# Patient Record
Sex: FEMALE | Race: WHITE | URBAN - METROPOLITAN AREA
[De-identification: names, ages, dates, MRNs, and addresses within clinical notes are randomized per-mention and may not be internally consistent; named-entity substitution may affect disease eponyms.]

---

## 2017-06-22 ENCOUNTER — OFFICE VISIT (OUTPATIENT)
Dept: FAMILY MEDICINE CLINIC | Age: 67
End: 2017-06-22

## 2017-06-22 VITALS
WEIGHT: 136.8 LBS | BODY MASS INDEX: 24.24 KG/M2 | DIASTOLIC BLOOD PRESSURE: 85 MMHG | OXYGEN SATURATION: 97 % | HEIGHT: 63 IN | SYSTOLIC BLOOD PRESSURE: 156 MMHG | TEMPERATURE: 98.6 F | HEART RATE: 100 BPM | RESPIRATION RATE: 16 BRPM

## 2017-06-22 DIAGNOSIS — B02.9 HERPES ZOSTER WITHOUT COMPLICATION: Primary | ICD-10-CM

## 2017-06-22 RX ORDER — VALACYCLOVIR HYDROCHLORIDE 1 G/1
1000 TABLET, FILM COATED ORAL 3 TIMES DAILY
Qty: 21 TAB | Refills: 0 | Status: SHIPPED | OUTPATIENT
Start: 2017-06-22 | End: 2017-06-29

## 2017-06-22 NOTE — MR AVS SNAPSHOT
Visit Information Date & Time Provider Department Dept. Phone Encounter #  
 6/22/2017  3:30 PM Sandra Adam  [facility] [address] [phone] [id number] Follow-up Instructions Return in about 6 months (around 12/22/2017) for fasting cpe with Dr. Viviana Colin. Upcoming Health Maintenance Date Due  
 BREAST CANCER SCRN MAMMOGRAM 2/12/2017 GLAUCOMA SCREENING Q2Y 5/23/2017 OSTEOPOROSIS SCREENING (DEXA) 5/23/2017 Pneumococcal 65+ Low/Medium Risk (1 of 2 - PCV13) 5/23/2017 MEDICARE YEARLY EXAM 5/23/2017 INFLUENZA AGE 9 TO ADULT 8/1/2017 PAP AKA CERVICAL CYTOLOGY 7/17/2018 COLONOSCOPY 1/1/2021 DTaP/Tdap/Td series (2 - Td) 12/8/2026 Allergies as of 6/22/2017  Review Complete On: 6/22/2017 By: Sandra Adam NP Severity Noted Reaction Type Reactions Bactrim [Sulfamethoprim Ds]  10/03/2016    Hives Other Medication  09/28/2015    Nausea Only Lipitor 20mg dose- caused GI upset 5-2010 Vytorin 10-10 [Ezetimibe-simvastatin]  05/19/2010    Other (comments) Elevated LFT's  
  
Current Immunizations  Reviewed on 12/8/2016 Name Date Influenza Vaccine 10/22/2016, 10/6/2014 TD Vaccine 1/18/2010 Tdap 12/8/2016 Zoster Vaccine, Live 7/6/2014 Not reviewed this visit You Were Diagnosed With   
  
 Codes Comments Herpes zoster without complication    -  Primary ICD-10-CM: B02.9 ICD-9-CM: 507. 9 Vitals BP Pulse Temp Resp Height(growth percentile) Weight(growth percentile) 156/85 (BP 1 Location: Left arm, BP Patient Position: Sitting) 100 98.6 °F (37 °C) (Oral) 16 5' 2.5\" (1.588 m) 136 lb 12.8 oz (62.1 kg) SpO2 BMI OB Status Smoking Status 97% 24.62 kg/m2 Postmenopausal Never Smoker Vitals History BMI and BSA Data Body Mass Index Body Surface Area  
 24.62 kg/m 2 1.65 m 2 Preferred Pharmacy Pharmacy Name Phone [facility] [address] [place] [address], [address] [place] [address] [phone] Your Updated Medication List  
  
   
This list is accurate as of: 6/22/17  4:13 PM.  Always use your most recent med list.  
  
  
  
  
 aspirin 81 mg tablet Take  by mouth. atorvastatin 10 mg tablet Commonly known as:  LIPITOR  
TAKE 1 TABLET DAILY CALCIUM 600 + D PO Take  by mouth daily. FISH OIL 1,200-144-216 mg Cap Generic drug:  fish oil-dha-epa Take  by mouth daily. levothyroxine 50 mcg tablet Commonly known as:  SYNTHROID  
TAKE 1 TABLET DAILY BEFORE BREAKFAST  
  
 mometasone 50 mcg/actuation nasal spray Commonly known as:  NASONEX  
2 Sprays by Both Nostrils route daily. tolterodine ER 4 mg ER capsule Commonly known as:  DETROL LA  
TAKE 1 CAPSULE DAILY  
  
 valACYclovir 1 gram tablet Commonly known as:  VALTREX Take 1 Tab by mouth three (3) times daily for 7 days. Prescriptions Sent to Pharmacy Refills  
 valACYclovir (VALTREX) 1 gram tablet 0 Sig: Take 1 Tab by mouth three (3) times daily for 7 days. Class: Normal  
 Pharmacy: [facility] [address] [place] [address], [address] [place] [address] #: [phone] Route: Oral  
  
Follow-up Instructions Return in about 6 months (around 12/22/2017) for fasting cpe with Dr. Wesley Phoenix. Patient Instructions Shingles: Care Instructions Your Care Instructions Shingles (herpes zoster) causes pain and a blistered rash. The rash can appear anywhere on the body but will be on only one side of the body, the left or right. It will be in a band, a strip, or a small area. The pain can be very severe. Shingles can also cause tingling or itching in the area of the rash. The blisters scab over after a few days and heal in 2 to 4 weeks.  Medicines can help you feel better and may help prevent more serious problems caused by shingles. Shingles is caused by the same virus that causes chickenpox. When you have chickenpox, the virus gets into your nerve roots and stays there (becomes dormant) long after you get over the chickenpox. If the virus becomes active again, it can cause shingles. Follow-up care is a key part of your treatment and safety. Be sure to make and go to all appointments, and call your doctor if you are having problems. It's also a good idea to know your test results and keep a list of the medicines you take. How can you care for yourself at home? · Be safe with medicines. Take your medicines exactly as prescribed. Call your doctor if you think you are having a problem with your medicine. Antiviral medicine helps you get better faster. · Try not to scratch or pick at the blisters. They will crust over and fall off on their own if you leave them alone. · Put cool, wet cloths on the area to relieve pain and itching. You can also use calamine lotion. Try not to use so much lotion that it cakes and is hard to get off. · Put cornstarch or baking soda on the sores to help dry them out so they heal faster. · Do not use thick ointment, such as petroleum jelly, on the sores. This will keep them from drying and healing. · To help remove loose crusts, soak them in tap water. This can help decrease oozing, and dry and soothe the skin. · Take an over-the-counter pain medicine, such as acetaminophen (Tylenol), ibuprofen (Advil, Motrin), or naproxen (Aleve). Read and follow all instructions on the label. · Avoid close contact with people until the blisters have healed. It is very important for you to avoid contact with anyone who has never had chickenpox or the chickenpox vaccine. Pregnant women, young babies, and anyone else who has a hard time fighting infection (such as someone with HIV, diabetes, or cancer) is especially at risk. When should you call for help? Call your doctor now or seek immediate medical care if: 
· You have a new or higher fever. · You have a severe headache and a stiff neck. · You lose the ability to think clearly. · The rash spreads to your forehead, nose, eyes, or eyelids. · You have eye pain, or your vision gets worse. · You have new pain in your face, or you cannot move the muscles in your face. · Blisters spread to new parts of your body. Watch closely for changes in your health, and be sure to contact your doctor if: · The rash has not healed after 2 to 4 weeks. · You still have pain after the rash has healed. Where can you learn more? Go to http://cynthia-iris.info/. Yeimi Mariscal in the search box to learn more about \"Shingles: Care Instructions. \" Current as of: March 3, 2017 Content Version: 11.3 © 6592-7468 [employer]. Care instructions adapted under license by [employer] (which disclaims liability or warranty for this information). If you have questions about a medical condition or this instruction, always ask your healthcare professional. [place] [address] any warranty or liability for your use of this information. Introducing [facility]! Dear [address]: Thank you for requesting a Medbox account. Our records indicate that you already have an active Medbox account. You can access your account anytime at [email or web address] Did you know that you can access your hospital and ER discharge instructions at any time in Medbox? You can also review all of your test results from your hospital stay or ER visit. Additional Information If you have questions, please visit the Frequently Asked Questions section of the Medbox website at [email or web address] Remember, Medbox is NOT to be used for urgent needs. For medical emergencies, dial 911. Now available from your iPhone and Android! Please provide this summary of care documentation to your next provider. Your primary care clinician is listed as LUPIS SEARS. If you have any questions after today's visit, please call [phone].

## 2017-06-22 NOTE — PROGRESS NOTES
Subjective:      Sharon Guaman is a 74 y.o. female who presents for painful rash. Symptoms presented rather suddenly. No known similar contacts. Denies associated fever, chills, or body aches. Has not attempted otc treatment. Currently living with young grandchild. Current Outpatient Prescriptions   Medication Sig Dispense Refill    valACYclovir (VALTREX) 1 gram tablet Take 1 Tab by mouth three (3) times daily for 7 days. 21 Tab 0    levothyroxine (SYNTHROID) 50 mcg tablet TAKE 1 TABLET DAILY BEFORE BREAKFAST 90 Tab 3    tolterodine ER (DETROL LA) 4 mg ER capsule TAKE 1 CAPSULE DAILY 90 Cap 3    atorvastatin (LIPITOR) 10 mg tablet TAKE 1 TABLET DAILY 90 Tab 3    mometasone (NASONEX) 50 mcg/actuation nasal spray 2 Sprays by Both Nostrils route daily. (Patient taking differently: 2 Sprays by Both Nostrils route daily as needed.) 3 Container 1    aspirin 81 mg tablet Take  by mouth.  CALCIUM CARBONATE/VITAMIN D3 (CALCIUM 600 + D PO) Take  by mouth daily.  fish oil-dha-epa (FISH OIL) 1,200-144-216 mg Cap Take  by mouth daily. Allergies   Allergen Reactions    Bactrim [Sulfamethoprim Ds] Hives    Other Medication Nausea Only     Lipitor 20mg dose- caused GI upset 5-2010    Vytorin 10-10 [Ezetimibe-Simvastatin] Other (comments)     Elevated LFT's       ROS:   Complete review of systems was reviewed with pertinent information listed in HPI. Objective:     Visit Vitals    /85 (BP 1 Location: Left arm, BP Patient Position: Sitting)    Pulse 100    Temp 98.6 °F (37 °C) (Oral)    Resp 16    Ht 5' 2.5\" (1.588 m)    Wt 136 lb 12.8 oz (62.1 kg)    SpO2 97%    BMI 24.62 kg/m2       Vitals and Nurse Documentation reviewed. General:  alert, cooperative, no distress   Skin: Herpetiform rash with surrounding erythema to the collarbone following a linear quality. Assessment/Plan:     Flavio Schilder was seen today for rash.     Diagnoses and all orders for this visit:    Herpes zoster without complication  -     valACYclovir (VALTREX) 1 gram tablet; Take 1 Tab by mouth three (3) times daily for 7 days. Treatment as above. Follow up if no improvement. Discussed potential for transmission of chickenpox.        Discussed expected course/resolution/complications of diagnosis in detail with patient.    Medication risks/benefits/costs/interactions/alternatives discussed with patient.    Pt was given an after visit summary which includes diagnoses, current medications & vitals.    Pt expressed understanding with the diagnosis and plan    Follow-up Disposition:  Return in about 6 months (around 12/22/2017) for fasting cpe with Dr. Tae Elena.

## 2017-06-22 NOTE — PATIENT INSTRUCTIONS

## 2017-06-22 NOTE — PROGRESS NOTES
Chief Complaint   Patient presents with    Rash     Rash on left side of neck noticed 6/20/2017     1. Have you been to the ER, urgent care clinic since your last visit? Hospitalized since your last visit? No    2. Have you seen or consulted any other health care providers outside of the [facility] since your last visit? Include any pap smears or colon screening.  No

## 2017-12-12 NOTE — TELEPHONE ENCOUNTER
Patient last seen by me 12-8-16 and due annual check but nothing scheduled and I am getting 90 day supply refill request. Can we get her in for a fasting routine med check for now and labs? Would she have enough to last til then?

## 2017-12-12 NOTE — TELEPHONE ENCOUNTER
Left message on Identified VM to call office a [phone] to schedule appointment, as she has not been seen in a year

## 2017-12-21 RX ORDER — ATORVASTATIN CALCIUM 10 MG/1
TABLET, FILM COATED ORAL
Qty: 30 TAB | Refills: 0 | Status: SHIPPED | OUTPATIENT
Start: 2017-12-21 | End: 2018-01-15 | Stop reason: SDUPTHER

## 2017-12-21 RX ORDER — TOLTERODINE 4 MG/1
CAPSULE, EXTENDED RELEASE ORAL
Qty: 30 CAP | Refills: 0 | Status: SHIPPED | OUTPATIENT
Start: 2017-12-21 | End: 2018-01-15 | Stop reason: SDUPTHER

## 2017-12-21 RX ORDER — LEVOTHYROXINE SODIUM 50 UG/1
TABLET ORAL
Qty: 30 TAB | Refills: 0 | Status: SHIPPED | OUTPATIENT
Start: 2017-12-21 | End: 2018-01-15 | Stop reason: SDUPTHER

## 2017-12-21 NOTE — TELEPHONE ENCOUNTER
----- Message from Pradip Franks sent at 12/21/2017  2:38 PM EST -----  Regarding: Dr. Joe Valle / Edi Arreguin  Pt is requesting a refill on Rx \"Atorvastatin\" 10 mg. Pt has 3 pills left that will last 3 days. [facility]: [phone]. Pt unsure of fax.    Pt's best contact: [phone]

## 2017-12-21 NOTE — TELEPHONE ENCOUNTER
Spoke to pt and she was scheduled for 12/22/17 for a physical.  She just found out today that she didn't have one anymore. I scheduled her for 1/15 @ 10:30. She said doing a 30 day supply to [facility] would be fine. rx sent in for 30 day per verbal order from Dr Bettie Banegas.

## 2018-01-15 ENCOUNTER — OFFICE VISIT (OUTPATIENT)
Dept: FAMILY MEDICINE CLINIC | Age: 68
End: 2018-01-15

## 2018-01-15 VITALS
SYSTOLIC BLOOD PRESSURE: 120 MMHG | HEIGHT: 63 IN | WEIGHT: 138.2 LBS | HEART RATE: 92 BPM | OXYGEN SATURATION: 99 % | DIASTOLIC BLOOD PRESSURE: 70 MMHG | RESPIRATION RATE: 16 BRPM | BODY MASS INDEX: 24.49 KG/M2 | TEMPERATURE: 98.8 F

## 2018-01-15 DIAGNOSIS — N32.81 OVERACTIVE BLADDER: ICD-10-CM

## 2018-01-15 DIAGNOSIS — Z11.59 NEED FOR HEPATITIS C SCREENING TEST: ICD-10-CM

## 2018-01-15 DIAGNOSIS — Z23 ENCOUNTER FOR IMMUNIZATION: ICD-10-CM

## 2018-01-15 DIAGNOSIS — Z00.00 ROUTINE GENERAL MEDICAL EXAMINATION AT A HEALTH CARE FACILITY: Primary | ICD-10-CM

## 2018-01-15 DIAGNOSIS — E03.9 HYPOTHYROIDISM, UNSPECIFIED TYPE: ICD-10-CM

## 2018-01-15 DIAGNOSIS — E78.00 HYPERCHOLESTEROLEMIA: ICD-10-CM

## 2018-01-15 RX ORDER — LEVOTHYROXINE SODIUM 50 UG/1
TABLET ORAL
Qty: 30 TAB | Refills: 0 | Status: SHIPPED | OUTPATIENT
Start: 2018-01-15 | End: 2018-02-03 | Stop reason: SDUPTHER

## 2018-01-15 RX ORDER — BISMUTH SUBSALICYLATE 262 MG
1 TABLET,CHEWABLE ORAL DAILY
COMMUNITY

## 2018-01-15 RX ORDER — ATORVASTATIN CALCIUM 10 MG/1
TABLET, FILM COATED ORAL
Qty: 30 TAB | Refills: 0 | Status: SHIPPED | OUTPATIENT
Start: 2018-01-15 | End: 2018-02-03 | Stop reason: SDUPTHER

## 2018-01-15 RX ORDER — TOLTERODINE 4 MG/1
CAPSULE, EXTENDED RELEASE ORAL
Qty: 30 CAP | Refills: 0 | Status: SHIPPED | OUTPATIENT
Start: 2018-01-15 | End: 2018-02-03 | Stop reason: SDUPTHER

## 2018-01-15 NOTE — PATIENT INSTRUCTIONS

## 2018-01-15 NOTE — PROGRESS NOTES
Chief Complaint   Patient presents with    Complete Physical     fasting - has a gyn -      1. Have you been to the ER, urgent care clinic since your last visit? Hospitalized since your last visit? No    2. Have you seen or consulted any other health care providers outside of the [address] [place] [address] since your last visit? Include any pap smears or colon screening.    Yes Dr César Mcneil - will fax for mammogram results

## 2018-01-15 NOTE — PROGRESS NOTES
HISTORY OF PRESENT ILLNESS   HPI  Annual CPE, fasting and follow up hypercholesterolemia, hypothyroidism, OAB, labs and medication check w/ rx renewals. Overall patient has been getting along well and feeling well in general.  Doing well on current medication regimen, tolerating w/o reaction or side effects. REVIEW OF SYMPTOMS     Review of Systems   Constitutional: Negative. HENT: Negative. Eyes:        Up to date routine eye exam, reportedly things are stable/ok   Respiratory: Negative. Cardiovascular: Negative. Gastrointestinal: Negative. Genitourinary: Negative. Detrol working well to control OAB sx   Musculoskeletal: Negative. Negative for myalgias. Neurological: Negative.     Endo/Heme/Allergies: Positive for environmental allergies (uses Nasonex seaasonally prn).           PROBLEM LIST/MEDICAL HISTORY      Problem List  Date Reviewed: 1/15/2018          Codes Class Noted    Hypercholesterolemia ICD-10-CM: E78.00  ICD-9-CM: 272.0  5/13/2011        H/O Transient Elevated liver enzymes ~2007, w/u Negative ICD-10-CM: R74.8  ICD-9-CM: 790.5  5/13/2011    Overview Signed 5/13/2011  1:06 PM by Rozina Rowan MD     2007, Hepatitis panel, GI w/u  and U/s negative             Overactive bladder ICD-10-CM: N32.81  ICD-9-CM: 596.51  5/13/2011        Hypothyroidism ICD-10-CM: E03.9  ICD-9-CM: 244.9  5/19/2010    Overview Signed 5/19/2010  3:46 PM by Yin Jean     2000             Family history of MI (myocardial infarction) ICD-10-CM: Z82.49  ICD-9-CM: V17.3  5/19/2010    Overview Signed 5/19/2010  3:46 PM by Yin Jean     Paternal at age 53 yo             Osteopenia ICD-10-CM: M85.80  ICD-9-CM: 733.90  5/19/2010    Overview Addendum 1/15/2018 10:52 AM by Rozina Rowan MD     Dexa per Gyn Dr Reyna Miller 12/2009, q 3 yrs             AR (allergic rhinitis) ICD-10-CM: J30.9  ICD-9-CM: 477.9  7/31/2002    Overview Addendum 8/28/2014  8:36 AM by Rozina Rowan MD ~2002; Fall worse season                       PAST SURGICAL HISTORY       Past Surgical History:   Procedure Laterality Date    COLONOSCOPY  2/2003    normal, but f/u 5 yr; Dr Osmany Burciaga  2011    ok x 10 yrs    EKG ORDERABLE  1/18/2010    Normal, NSR, rate 76    HM DEXA SCAN  12/2009    Osteopenia, per Gyn    [address]    for infertility eval    HX SALPINGO-OOPHORECTOMY  1970's    for ectopic pregnancy    COOPER MAMMOGRAM SCREEN BILAT  VPI q yr    annually per Dr Jones Haro ABD COMP  9/2007, [facility]    normal         MEDICATIONS      Current Outpatient Prescriptions   Medication Sig    multivitamin (ONE A DAY) tablet Take 1 Tab by mouth daily.  tolterodine ER (DETROL LA) 4 mg ER capsule TAKE 1 CAPSULE DAILY  Indications: Bladder Hyperactivity    levothyroxine (SYNTHROID) 50 mcg tablet TAKE 1 TABLET DAILY BEFORE BREAKFAST  Indications: hypothyroidism    atorvastatin (LIPITOR) 10 mg tablet TAKE 1 TABLET DAILY  Indications: hypercholesterolemia    mometasone (NASONEX) 50 mcg/actuation nasal spray 2 Sprays by Both Nostrils route daily. (Patient taking differently: 2 Sprays by Both Nostrils route daily as needed.)    aspirin 81 mg tablet Take  by mouth.  CALCIUM CARBONATE/VITAMIN D3 (CALCIUM 600 + D PO) Take  by mouth daily.  fish oil-dha-epa (FISH OIL) 1,200-144-216 mg Cap Take  by mouth daily. No current facility-administered medications for this visit.            ALLERGIES     Allergies   Allergen Reactions    Bactrim [Sulfamethoprim Ds] Hives    Other Medication Nausea Only     Lipitor 20mg dose- caused GI upset 5-2010    Vytorin 10-10 [Ezetimibe-Simvastatin] Other (comments)     Elevated LFT's          SOCIAL HISTORY       Social History     Social History    Marital status:      Spouse name: N/A    Number of children: 2    Years of education: N/A     Occupational History    HR      Social History Main Topics    Smoking status: Never Smoker    Smokeless tobacco: Never Used    Alcohol use Yes      Comment: occasional wine    Drug use: No    Sexual activity: Yes     Partners: Male     Other Topics Concern    Caffeine Concern No     2 cups of coffee qd    Weight Concern No     pretty stable, runs btw 130-135 over the yrs    Special Diet No     healthy diet    Exercise Yes     walks qd x 30-45 minutes     Social History Narrative    2 adopted children; her daughter, son in law and new grandbaby live w her now        IMMUNIZATIONS  Immunization History   Administered Date(s) Administered    Influenza Vaccine 10/06/2014, 10/22/2016, 10/17/2017    TD Vaccine 2010    Tdap 2016    Zoster Vaccine, Live 2014         FAMILY HISTORY     Family History   Problem Relation Age of Onset    Breast Cancer Mother      dx in her mid 66's, survivor    Hypertension Mother     Stroke Mother     Thyroid Disease Mother     Heart Disease Mother       of pneumonia age 80    Heart Disease Father     Heart Attack Father 52    Breast Cancer Sister 48     survivor    High Cholesterol Sister     High Cholesterol Brother     High Cholesterol Brother          VITALS     Visit Vitals    /70 (BP 1 Location: Left arm, BP Patient Position: Sitting)    Pulse 92    Temp 98.8 °F (37.1 °C) (Oral)    Resp 16    Ht 5' 2.75\" (1.594 m)    Wt 138 lb 3.2 oz (62.7 kg)    SpO2 99%    BMI 24.68 kg/m2          PHYSICAL EXAMINATION     Physical Exam   Constitutional: She is oriented to person, place, and time and well-developed, well-nourished, and in no distress. HENT:   Right Ear: Tympanic membrane normal.   Left Ear: Tympanic membrane normal.   Nose: Nose normal.   Mouth/Throat: Oropharynx is clear and moist. No oropharyngeal exudate. Eyes: Conjunctivae and EOM are normal. Pupils are equal, round, and reactive to light. Neck: Neck supple. No JVD present. Carotid bruit is not present. No thyromegaly present.    Cardiovascular: Normal rate, regular rhythm and normal heart sounds. No murmur heard. Pulmonary/Chest: Effort normal and breath sounds normal.   Abdominal: Soft. Bowel sounds are normal. She exhibits no distension and no mass. There is no tenderness. Musculoskeletal: Normal range of motion. She exhibits no edema or tenderness. Lymphadenopathy:     She has no cervical adenopathy. Neurological: She is alert and oriented to person, place, and time. She has normal reflexes. No cranial nerve deficit. Coordination normal.   Skin: Skin is warm. Psychiatric: Affect normal.   Vitals reviewed.              ASSESSMENT & PLAN       ICD-10-CM ICD-9-CM    1. Routine general medical examination at a health care facility Z00.00 V70.0 CBC W/O DIFF      LIPID PANEL      METABOLIC PANEL, COMPREHENSIVE      TSH 3RD GENERATION      T4, FREE      URINALYSIS W/ RFLX MICROSCOPIC   2. Hypercholesterolemia E78.00 272.0 LIPID PANEL      atorvastatin (LIPITOR) 10 mg tablet   3. Hypothyroidism, unspecified type E03.9 244.9 TSH 3RD GENERATION      T4, FREE      levothyroxine (SYNTHROID) 50 mcg tablet   4. Overactive bladder N32.81 596.51 URINALYSIS W/ RFLX MICROSCOPIC      tolterodine ER (DETROL LA) 4 mg ER capsule   5. Need for hepatitis C screening test Z11.59 V73.89 HEPATITIS C AB   6. Encounter for immunization Z23 V03.89 PNEUMOCOCCAL CONJ VACCINE 13 VALENT IM      [place] IMMUNIZ ADMIN,1 SINGLE/COMB VAC/TOXOID     Fasting labs today  Reviewed diet, exercise and weight control  Cardiovascular risk and specific lipid/LDL goals reviewed  Reviewed medications and side effects, doing well on current medication regimen. She would like 30 day local supply sent to her local pharm, then after review of today's labs will need rx renewal for 90 days through [facility] for the year. Further follow up & other recommendations pending review of labs. If all remains good and stable, rtc 1 yr annual checkup.  Follow up sooner prn  Sees gyn annually in ~ August. Had reportedly negative mammogram and last pap reportedly negative and told can dc.  Colonoscopy up to date.

## 2018-01-16 LAB
ALBUMIN SERPL-MCNC: 4.5 G/DL (ref 3.6–4.8)
ALBUMIN/GLOB SERPL: 1.8 {RATIO} (ref 1.2–2.2)
ALP SERPL-CCNC: 67 IU/L (ref 39–117)
ALT SERPL-CCNC: 16 IU/L (ref 0–32)
APPEARANCE UR: CLEAR
AST SERPL-CCNC: 20 IU/L (ref 0–40)
BILIRUB SERPL-MCNC: 0.3 MG/DL (ref 0–1.2)
BILIRUB UR QL STRIP: NEGATIVE
BUN SERPL-MCNC: 14 MG/DL (ref 8–27)
BUN/CREAT SERPL: 18 (ref 12–28)
CALCIUM SERPL-MCNC: 9.4 MG/DL (ref 8.7–10.3)
CHLORIDE SERPL-SCNC: 99 MMOL/L (ref 96–106)
CHOLEST SERPL-MCNC: 177 MG/DL (ref 100–199)
CO2 SERPL-SCNC: 27 MMOL/L (ref 18–29)
COLOR UR: YELLOW
CREAT SERPL-MCNC: 0.76 MG/DL (ref 0.57–1)
ERYTHROCYTE [DISTWIDTH] IN BLOOD BY AUTOMATED COUNT: 12.9 % (ref 12.3–15.4)
GLOBULIN SER CALC-MCNC: 2.5 G/DL (ref 1.5–4.5)
GLUCOSE SERPL-MCNC: 88 MG/DL (ref 65–99)
GLUCOSE UR QL: NEGATIVE
HCT VFR BLD AUTO: 36.4 % (ref 34–46.6)
HCV AB S/CO SERPL IA: <0.1 S/CO RATIO (ref 0–0.9)
HDLC SERPL-MCNC: 52 MG/DL
HGB BLD-MCNC: 11.9 G/DL (ref 11.1–15.9)
HGB UR QL STRIP: NEGATIVE
INTERPRETATION, 910389: NORMAL
KETONES UR QL STRIP: NEGATIVE
LDLC SERPL CALC-MCNC: 98 MG/DL (ref 0–99)
LEUKOCYTE ESTERASE UR QL STRIP: NEGATIVE
MCH RBC QN AUTO: 30.1 PG (ref 26.6–33)
MCHC RBC AUTO-ENTMCNC: 32.7 G/DL (ref 31.5–35.7)
MCV RBC AUTO: 92 FL (ref 79–97)
MICRO URNS: NORMAL
NITRITE UR QL STRIP: NEGATIVE
PH UR STRIP: 6 [PH] (ref 5–7.5)
PLATELET # BLD AUTO: 367 X10E3/UL (ref 150–379)
POTASSIUM SERPL-SCNC: 4.2 MMOL/L (ref 3.5–5.2)
PROT SERPL-MCNC: 7 G/DL (ref 6–8.5)
PROT UR QL STRIP: NEGATIVE
RBC # BLD AUTO: 3.95 X10E6/UL (ref 3.77–5.28)
SODIUM SERPL-SCNC: 140 MMOL/L (ref 134–144)
SP GR UR: 1.02 (ref 1–1.03)
T4 FREE SERPL-MCNC: 1.32 NG/DL (ref 0.82–1.77)
TRIGL SERPL-MCNC: 134 MG/DL (ref 0–149)
TSH SERPL DL<=0.005 MIU/L-ACNC: 2.87 UIU/ML (ref 0.45–4.5)
UROBILINOGEN UR STRIP-MCNC: 0.2 MG/DL (ref 0.2–1)
VLDLC SERPL CALC-MCNC: 27 MG/DL (ref 5–40)
WBC # BLD AUTO: 7 X10E3/UL (ref 3.4–10.8)

## 2018-01-28 ENCOUNTER — TELEPHONE (OUTPATIENT)
Dept: FAMILY MEDICINE CLINIC | Age: 68
End: 2018-01-28

## 2018-01-28 NOTE — TELEPHONE ENCOUNTER
Hep C negative  Urine, Blood counts, liver, kidney, thyroid  BS normal  Lipids excellent  Things look good  Keep up good work  Same meds  Rtc 1 yr

## 2018-02-03 DIAGNOSIS — N32.81 OVERACTIVE BLADDER: ICD-10-CM

## 2018-02-03 DIAGNOSIS — E03.9 HYPOTHYROIDISM, UNSPECIFIED TYPE: ICD-10-CM

## 2018-02-03 DIAGNOSIS — E78.00 HYPERCHOLESTEROLEMIA: Primary | ICD-10-CM

## 2018-02-03 RX ORDER — TOLTERODINE 4 MG/1
CAPSULE, EXTENDED RELEASE ORAL
Qty: 90 CAP | Refills: 3 | Status: SHIPPED | OUTPATIENT
Start: 2018-02-03 | End: 2019-02-04 | Stop reason: SDUPTHER

## 2018-02-03 RX ORDER — ATORVASTATIN CALCIUM 10 MG/1
TABLET, FILM COATED ORAL
Qty: 90 TAB | Refills: 3 | Status: SHIPPED | OUTPATIENT
Start: 2018-02-03 | End: 2019-02-04 | Stop reason: SDUPTHER

## 2018-02-03 RX ORDER — LEVOTHYROXINE SODIUM 50 UG/1
TABLET ORAL
Qty: 90 TAB | Refills: 3 | Status: SHIPPED | OUTPATIENT
Start: 2018-02-03 | End: 2019-02-04 | Stop reason: SDUPTHER

## 2019-01-16 ENCOUNTER — HOSPITAL ENCOUNTER (OUTPATIENT)
Dept: LAB | Age: 69
Discharge: HOME | End: 2019-01-16
Payer: MEDICARE

## 2019-01-16 ENCOUNTER — OFFICE VISIT (OUTPATIENT)
Dept: FAMILY MEDICINE CLINIC | Age: 69
End: 2019-01-16

## 2019-01-16 VITALS
HEIGHT: 63 IN | DIASTOLIC BLOOD PRESSURE: 76 MMHG | RESPIRATION RATE: 18 BRPM | WEIGHT: 135 LBS | OXYGEN SATURATION: 97 % | HEART RATE: 103 BPM | TEMPERATURE: 99.6 F | BODY MASS INDEX: 23.92 KG/M2 | SYSTOLIC BLOOD PRESSURE: 114 MMHG

## 2019-01-16 DIAGNOSIS — E78.00 HYPERCHOLESTEROLEMIA: ICD-10-CM

## 2019-01-16 DIAGNOSIS — E03.9 HYPOTHYROIDISM, UNSPECIFIED TYPE: ICD-10-CM

## 2019-01-16 DIAGNOSIS — Z23 ENCOUNTER FOR IMMUNIZATION: ICD-10-CM

## 2019-01-16 DIAGNOSIS — Z00.00 WELCOME TO MEDICARE PREVENTIVE VISIT: Primary | ICD-10-CM

## 2019-01-16 DIAGNOSIS — Z71.89 ACP (ADVANCE CARE PLANNING): ICD-10-CM

## 2019-01-16 PROCEDURE — 80053 COMPREHEN METABOLIC PANEL: CPT

## 2019-01-16 PROCEDURE — 80061 LIPID PANEL: CPT

## 2019-01-16 PROCEDURE — 36415 COLL VENOUS BLD VENIPUNCTURE: CPT

## 2019-01-16 PROCEDURE — 85027 COMPLETE CBC AUTOMATED: CPT

## 2019-01-16 PROCEDURE — 84443 ASSAY THYROID STIM HORMONE: CPT

## 2019-01-16 NOTE — PROGRESS NOTES
This is a \"Welcome to [employer]"  Initial Preventive Physical Examination (IPPE) providing Personalized Prevention Plan Services (Performed in the first 12 months of enrollment) I have reviewed the patient's medical history in detail and updated the computerized patient record. History Past Medical History:  
Diagnosis Date  AR (allergic rhinitis) 7/31/2002  Family history of MI (myocardial infarction) 5/19/2010  
 H/O Transient Elevated liver enzymes ~2007, w/u Negative 5/13/2011  Hypercholesterolemia 5/13/2011  Hypothyroidism 5/19/2010  Osteopenia 5/19/2010  Overactive bladder 5/13/2011 Past Surgical History:  
Procedure Laterality Date  COLONOSCOPY  2/2003  
 normal, but f/u 5 yr; Dr Fernández Massed  COLONOSCOPY  2011  
 ok x 10 yrs, Dr Chopra Loop  EKG ORDERABLE  1/18/2010 Normal, NSR, rate 76  
 HM DEXA SCAN  12/2009 Osteopenia, per Gyn  
 [address] [place] [address] Bl  
 for infertility eval  
 HX SALPINGO-OOPHORECTOMY  1970's  
 for ectopic pregnancy  COOPER MAMMOGRAM SCREEN BILAT  VPI q yr  
 annually per Dr Jimmy Hammond  US ABD COMP  9/2007, [address]  
 normal  
 
Current Outpatient Medications Medication Sig Dispense Refill  tolterodine ER (DETROL LA) 4 mg ER capsule TAKE 1 CAPSULE DAILY  Indications: Bladder Hyperactivity 90 Cap 3  
 levothyroxine (SYNTHROID) 50 mcg tablet TAKE 1 TABLET DAILY BEFORE BREAKFAST  Indications: hypothyroidism 90 Tab 3  
 atorvastatin (LIPITOR) 10 mg tablet TAKE 1 TABLET DAILY  Indications: hypercholesterolemia 90 Tab 3  
 multivitamin (ONE A DAY) tablet Take 1 Tab by mouth daily.  mometasone (NASONEX) 50 mcg/actuation nasal spray 2 Sprays by Both Nostrils route daily. (Patient taking differently: 2 Sprays by Both Nostrils route daily as needed.) 3 Container 1  
 aspirin 81 mg tablet Take  by mouth.  CALCIUM CARBONATE/VITAMIN D3 (CALCIUM 600 + D PO) Take  by mouth daily.  fish oil-dha-epa (FISH OIL) 1,200-144-216 mg Cap Take  by mouth daily. Allergies Allergen Reactions  Bactrim [Sulfamethoprim Ds] Hives  Other Medication Nausea Only Lipitor 20mg dose- caused GI upset -  Vytorin 10-10 [Ezetimibe-Simvastatin] Other (comments) Elevated LFT's Family History Problem Relation Age of Onset  Breast Cancer Mother   
     dx in her mid 66's, survivor  Hypertension Mother  Stroke Mother  Thyroid Disease Mother  Heart Disease Mother   
      of pneumonia age 80  
 Heart Disease Father  Heart Attack Father 52  Breast Cancer Sister 48  
     survivor  High Cholesterol Sister  High Cholesterol Brother  High Cholesterol Brother Social History Tobacco Use  Smoking status: Never Smoker  Smokeless tobacco: Never Used Substance Use Topics  Alcohol use: Yes Comment: occasional wine Diet, Lifestyle: No special diet Exercise level: moderately active Depression Risk Screen PHQ over the last two weeks 2019 PHQ Not Done - Little interest or pleasure in doing things Not at all Feeling down, depressed, irritable, or hopeless Not at all Total Score PHQ 2 0 Alcohol Risk Screen You do not drink alcohol or very rarely. Functional Ability and Level of Safety Hearing Loss Hearing is good. Vision Screening Vision is good. No exam data present Activities of Daily Living The home contains: no safety equipment. Patient does total self care Fall Risk Screen Fall Risk Assessment, last 12 mths 2019 Able to walk? Yes Fall in past 12 months? No  
 
 
Abuse Screen Patient is not abused Screening EKG EKG order placed: No 
 
Patient Care Team  
Patient Care Team: 
Darrion Morris MD as PCP - General 
Dorothy Mojica MD (Obstetrics & Gynecology) Ritika Deutsch MD (Obstetrics & Gynecology) End of Life Planning Advanced care planning directives were discussed with the patient and /or family/caregiver. Assessment/Plan Education and counseling provided: 
Are appropriate based on today's review and evaluation Diagnoses and all orders for this visit: 
 
1. Welcome to Medicare preventive visit 2. Encounter for immunization -     ADMIN PNEUMOCOCCAL VACCINE 
-     PNEUMOCOCCAL POLYSACCHARIDE VACCINE, 23-VALENT, ADULT OR IMMUNOSUPPRESSED PT DOSE, 3. ACP Discussion and note updated Health Maintenance Due Topic Date Due  Pneumococcal 65+ Low/Medium Risk (2 of 2 - PPSV23) 01/15/2019

## 2019-01-16 NOTE — PATIENT INSTRUCTIONS
Medicare Wellness Visit, Female The best way to live healthy is to have a lifestyle where you eat a well-balanced diet, exercise regularly, limit alcohol use, and quit all forms of tobacco/nicotine, if applicable. Regular preventive services are another way to keep healthy. Preventive services (vaccines, screening tests, monitoring & exams) can help personalize your care plan, which helps you manage your own care. Screening tests can find health problems at the earliest stages, when they are easiest to treat. [facility] follows the current, evidence-based guidelines published by the [facility] Frankie Ara ([facility]) when recommending preventive services for our patients. Because we follow these guidelines, sometimes recommendations change over time as research supports it. (For example, mammograms used to be recommended annually. Even though Medicare will still pay for an annual mammogram, the newer guidelines recommend a mammogram every two years for women of average risk.) Of course, you and your doctor may decide to screen more often for some diseases, based on your risk and your health status. Preventive services for you include: - Medicare offers their members a free annual wellness visit, which is time for you and your primary care provider to discuss and plan for your preventive service needs. Take advantage of this benefit every year! 
-All adults over the age of 72 should receive the recommended pneumonia vaccines. Current USPSTF guidelines recommend a series of two vaccines for the best pneumonia protection.  
-All adults should have a flu vaccine yearly and a tetanus vaccine every 10 years. All adults age 61 and older should receive a shingles vaccine once in their lifetime.   
-A bone mass density test is recommended when a woman turns 65 to screen for osteoporosis. This test is only recommended one time, as a screening. Some providers will use this same test as a disease monitoring tool if you already have osteoporosis. -All adults age 38-68 who are overweight should have a diabetes screening test once every three years.  
-Other screening tests and preventive services for persons with diabetes include: an eye exam to screen for diabetic retinopathy, a kidney function test, a foot exam, and stricter control over your cholesterol.  
-Cardiovascular screening for adults with routine risk involves an electrocardiogram (ECG) at intervals determined by your doctor.  
-Colorectal cancer screenings should be done for adults age 54-65 with no increased risk factors for colorectal cancer. There are a number of acceptable methods of screening for this type of cancer. Each test has its own benefits and drawbacks. Discuss with your doctor what is most appropriate for you during your annual wellness visit. The different tests include: colonoscopy (considered the best screening method), a fecal occult blood test, a fecal DNA test, and sigmoidoscopy. -Breast cancer screenings are recommended every other year for women of normal risk, age 54-69. 
-Cervical cancer screenings for women over age 72 are only recommended with certain risk factors.  
-All adults born between [facility] should be screened once for Hepatitis C. Here is a list of your current Health Maintenance items (your personalized list of preventive services) with a due date: 
Health Maintenance Due Topic Date Due  Pneumococcal Vaccine (2 of 2 - PPSV23) 01/15/2019 Vaccine Information Statement Pneumococcal Polysaccharide Vaccine: What You Need to Know Many Vaccine Information Statements are available in [place]ian and other languages. See www.immunize.org/vis. Hojas de información Sobre Vacunas están disponibles en español y en muchos otros idiomas. Visite Sarah Beth.si. 1. Why get vaccinated? Vaccination can protect older adults (and some children and younger adults) from pneumococcal disease. Pneumococcal disease is caused by bacteria that can spread from person to person through close contact. It can cause ear infections, and it can also lead to more serious infections of the: 
 Lungs (pneumonia),  Blood (bacteremia), and 
 Covering of the brain and spinal cord (meningitis). Meningitis can cause deafness and brain damage, and it can be fatal.   
 
Anyone can get pneumococcal disease, but children under 3years of age, people with certain medical conditions, adults over 72years of age, and cigarette smokers are at the highest risk. About 18,000 older adults die each year from pneumococcal disease in the [place]. Treatment of pneumococcal infections with penicillin and other drugs used to be more effective. But some strains of the disease have become resistant to these drugs. This makes prevention of the disease, through vaccination, even more important. 2. Pneumococcal polysaccharide vaccine (PPSV23) Pneumococcal polysaccharide vaccine (PPSV23) protects against 23 types of pneumococcal bacteria. It will not prevent all pneumococcal disease. PPSV23 is recommended for:  All adults 72years of age and older,  Anyone 2 through 59years of age with certain long-term health problems, 
 Anyone 2 through 59years of age with a weakened immune system, 
 Adults 23 through 59years of age who smoke cigarettes or have asthma. Most people need only one dose of PPSV. A second dose is recommended for certain high-risk groups. People 72 and older should get a dose even if they have gotten one or more doses of the vaccine before they turned 65. Your healthcare provider can give you more information about these recommendations. Most healthy adults develop protection within 2 to 3 weeks of getting the shot. 3. Some people should not get this vaccine  Anyone who has had a life-threatening allergic reaction to PPSV should not get another dose.  Anyone who has a severe allergy to any component of PPSV should not receive it. Tell your provider if you have any severe allergies.  Anyone who is moderately or severely ill when the shot is scheduled may be asked to wait until they recover before getting the vaccine. Someone with a mild illness can usually be vaccinated.  Children less than 3years of age should not receive this vaccine.  There is no evidence that PPSV is harmful to either a pregnant woman or to her fetus. However, as a precaution, women who need the vaccine should be vaccinated before becoming pregnant, if possible. 4. Risks of a vaccine reaction With any medicine, including vaccines, there is a chance of side effects. These are usually mild and go away on their own, but serious reactions are also possible. About half of people who get PPSV have mild side effects, such as redness or pain where the shot is given, which go away within about two days. Less than 1 out of 100 people develop a fever, muscle aches, or more severe local reactions. Problems that could happen after any vaccine:  People sometimes faint after a medical procedure, including vaccination. Sitting or lying down for about 15 minutes can help prevent fainting, and injuries caused by a fall. Tell your doctor if you feel dizzy, or have vision changes or ringing in the ears.  Some people get severe pain in the shoulder and have difficulty moving the arm where a shot was given. This happens very rarely.  Any medication can cause a severe allergic reaction. Such reactions from a vaccine are very rare, estimated at about 1 in a million doses, and would happen within a few minutes to a few hours after the vaccination. As with any medicine, there is a very remote chance of a vaccine causing a serious injury or death. The safety of vaccines is always being monitored. For more information, visit: www.cdc.gov/vaccinesafety/  
 
5. What if there is a serious reaction? What should I look for? Look for anything that concerns you, such as signs of a severe allergic reaction, very high fever, or unusual behavior. Signs of a severe allergic reaction can include hives, swelling of the face and throat, difficulty breathing, a fast heartbeat, dizziness, and weakness. These would usually start a few minutes to a few hours after the vaccination. What should I do? If you think it is a severe allergic reaction or other emergency that cant wait, call 9-1-1 or get to the nearest hospital. Otherwise, call your doctor. Afterward, the reaction should be reported to the Vaccine Adverse Event Reporting System (VAERS). Your doctor might file this report, or you can do it yourself through the VAERS web site at www.vaers. Labcyte.gov, or by calling 0-629.224.9475. [employer] does not give medical advice. 6. How can I learn more?  Ask your doctor. He or she can give you the vaccine package insert or suggest other sources of information.  Call your local or state health department.  Contact the Centers for Disease Control and Prevention (CDC): 
- Call 1-990.588.7240 (1-800-CDC-INFO) or 
- Visit CDCs website at www.cdc.gov/vaccines Vaccine Information Statement PPSV  
04/24/2015 Department of Health and [facility] [place] Centers for Disease Control and Prevention Office Use Only

## 2019-01-16 NOTE — PROGRESS NOTES
Chief Complaint Patient presents with [facility] Annual Wellness Visit -Welcome to-fasting 1. Have you been to the ER, urgent care clinic since your last visit? Hospitalized since your last visit? No 
 
2. Have you seen or consulted any other health care providers outside of the [address] [place] [address] since your last visit? Include any pap smears or colon screening.  No

## 2019-01-16 NOTE — PROGRESS NOTES
HISTORY OF PRESENT ILLNESS  
HPI Fasting follow up hyperlipidemia, hypothyroidism, labs and medication check. Overall has been getting along well and feeling well in general. 
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 Follows sensible diet and walks dog every day. Wt has been stable. She got Shingrix #2 yesterday, feels a little achy and tired today but otherwise in general has been feeling well. Would like to go ahead and get her PCV 23 today as well. REVIEW OF SYMPTOMS  
  Review of Systems HENT: Negative. Eyes: Negative. Respiratory: Negative. Cardiovascular: Negative. Negative for chest pain and palpitations. Gastrointestinal: Negative. Negative for abdominal pain, blood in stool, constipation, diarrhea, nausea and vomiting. Genitourinary: Negative. Musculoskeletal: Negative for myalgias. Neurological: Negative. Endo/Heme/Allergies: Negative.   
 
 
  
 PROBLEM LIST/MEDICAL HISTORY  
  
Problem List  Date Reviewed: 1/16/2019 Codes Class Noted ACP (advance care planning) ICD-10-CM: Z71.89 ICD-9-CM: V65.49  1/16/2019 Overview Signed 1/16/2019  9:56 AM by Félix Katz MD  
  Discussion [id number]. Patient has an AMD. Asked to provide copy for file. Hypercholesterolemia ICD-10-CM: E78.00 ICD-9-CM: 272.0  5/13/2011 H/O Transient Elevated liver enzymes ~2007, w/u Negative ICD-10-CM: R74.8 ICD-9-CM: 790.5  5/13/2011 Overview Signed 5/13/2011  1:06 PM by Félix Katz MD  
  2007, Hepatitis panel, GI w/u  and U/s negative Overactive bladder ICD-10-CM: N32.81 ICD-9-CM: 596.51  5/13/2011 Hypothyroidism ICD-10-CM: E03.9 ICD-9-CM: 244.9  5/19/2010 Overview Signed 5/19/2010  3:46 PM by Ajay Chavez [address] Family history of MI (myocardial infarction) ICD-10-CM: Z82.49 
ICD-9-CM: V17.3  5/19/2010 Overview Signed 5/19/2010  3:46 PM by Ana Mccray Paternal at age 53 yo Osteopenia ICD-10-CM: M85.80 ICD-9-CM: 733.90  5/19/2010 Overview Addendum 1/15/2018 10:52 AM by Mallika Hill MD  
  Dexa per Gyn Dr Modesta Preciado 12/2009, q 3 yrs AR (allergic rhinitis) ICD-10-CM: J30.9 ICD-9-CM: 477.9  7/31/2002 Overview Addendum 8/28/2014  8:36 AM by Mallika Hill MD  
  ~8226; Fall worse season 
  
  
   
  
 
 
  PAST SURGICAL HISTORY  
   
Past Surgical History:  
Procedure Laterality Date  COLONOSCOPY  2/2003  
 normal, but f/u 5 yr; Dr Jaycee Ahn  COLONOSCOPY  2011  
 ok x 10 yrs, Dr Mic Clifton  EKG ORDERABLE  1/18/2010 Normal, NSR, rate 76  
 HM DEXA SCAN  12/2009 Osteopenia, per Gyn  
 [address] [place] [address] [address]  
 for infertility eval  
 HX SALPINGO-OOPHORECTOMY  1970's  
 for ectopic pregnancy  COOPER MAMMOGRAM SCREEN BILAT  VPI q yr  
 annually per Dr Modesta Preciado  US ABD COMP  9/2007, [address]  
 normal  
 
 
 
MEDICATIONS  
  
Current Outpatient Medications Medication Sig  tolterodine ER (DETROL LA) 4 mg ER capsule TAKE 1 CAPSULE DAILY  Indications: Bladder Hyperactivity  levothyroxine (SYNTHROID) 50 mcg tablet TAKE 1 TABLET DAILY BEFORE BREAKFAST  Indications: hypothyroidism  atorvastatin (LIPITOR) 10 mg tablet TAKE 1 TABLET DAILY  Indications: hypercholesterolemia  multivitamin (ONE A DAY) tablet Take 1 Tab by mouth daily.  mometasone (NASONEX) 50 mcg/actuation nasal spray 2 Sprays by Both Nostrils route daily. (Patient taking differently: 2 Sprays by Both Nostrils route daily as needed.)  aspirin 81 mg tablet Take  by mouth.  CALCIUM CARBONATE/VITAMIN D3 (CALCIUM 600 + D PO) Take  by mouth daily.  fish oil-dha-epa (FISH OIL) 1,200-144-216 mg Cap Take  by mouth daily. No current facility-administered medications for this visit. ALLERGIES Allergies Allergen Reactions  Bactrim [Sulfamethoprim Ds] Hives  Other Medication Nausea Only Lipitor 20mg dose- caused GI upset 5-2010  Vytorin 10-10 [Ezetimibe-Simvastatin] Other (comments) Elevated LFT's  
 
 
 
 SOCIAL HISTORY  
   
Social History Socioeconomic History  Marital status:  Spouse name: Not on file  Number of children: 2  
 Years of education: Not on file  Highest education level: Not on file Social Needs  Financial resource strain: Not on file  Food insecurity - worry: Not on file  Food insecurity - inability: Not on file  Transportation needs - medical: Not on file  Transportation needs - non-medical: Not on file Occupational History  Occupation: HR  
 Occupation: Retired 1-1-19 Tobacco Use  Smoking status: Never Smoker  Smokeless tobacco: Never Used Substance and Sexual Activity  Alcohol use: Yes Comment: occasional wine  Drug use: No  
 Sexual activity: Yes  
  Partners: Male Other Topics Concern   Service No  
 Blood Transfusions No  
 Caffeine Concern No  
  Comment: 2 cups of coffee qd  
 Occupational Exposure No  
 Hobby Hazards No  
 Sleep Concern No  
 Stress Concern No  
 Weight Concern No  
  Comment: pretty stable, runs btw 130-135 over the yrs  Special Diet No  
  Comment: pretty healthy diet  Back Care No  
 Exercise Yes Comment: walks dog qd x 30-45 minutes  Bike Helmet No  
 Seat Belt Yes  Self-Exams Yes Social History Narrative  
 2 adopted children; her daughter, son in law and new grandbaby live w her now  
 
  
IMMUNIZATIONS Immunization History Administered Date(s) Administered  Influenza Vaccine 10/06/2014, 10/22/2016, 10/17/2017, 10/16/2018  Pneumococcal Conjugate (PCV-13) 01/15/2018  TD Vaccine 01/18/2010  Tdap 12/08/2016  Zoster Recombinant 10/10/2018, 01/15/2019  Zoster Vaccine, Live 07/06/2014 FAMILY HISTORY Family History Problem Relation Age of Onset  Breast Cancer Mother dx in her mid 66's, survivor  Hypertension Mother  Stroke Mother  Thyroid Disease Mother  Heart Disease Mother   
      of pneumonia age 80  
 Heart Disease Father  Heart Attack Father 52  Breast Cancer Sister 48  
     survivor  High Cholesterol Sister  High Cholesterol Brother  High Cholesterol Brother Bela Ericksonri Visit Vitals /76 (BP 1 Location: Left arm, BP Patient Position: Sitting) Pulse (!) 103 Temp 99.6 °F (37.6 °C) (Oral) Resp 18 Ht 5' 3\" (1.6 m) Wt 135 lb (61.2 kg) SpO2 97% BMI 23.91 kg/m² PHYSICAL EXAMINATION  
  Physical Exam  
Constitutional: She is oriented to person, place, and time and well-developed, well-nourished, and in no distress. HENT:  
Right Ear: Tympanic membrane normal.  
Left Ear: Tympanic membrane normal.  
Mouth/Throat: Oropharynx is clear and moist. No oropharyngeal exudate. Eyes: Conjunctivae and EOM are normal. Pupils are equal, round, and reactive to light. Neck: Neck supple. No thyromegaly present. Cardiovascular: Regular rhythm. No murmur heard. Pulmonary/Chest: Effort normal and breath sounds normal. No respiratory distress. Abdominal: Soft. Bowel sounds are normal. She exhibits no distension and no mass. There is no tenderness. Musculoskeletal: Normal range of motion. She exhibits no edema or tenderness. Lymphadenopathy:  
  She has no cervical adenopathy. Neurological: She is alert and oriented to person, place, and time. Skin: Skin is warm and dry. Psychiatric: Mood and affect normal.  
Vitals reviewed. 
 
 
  
 
 
 ASSESSMENT & PLAN  
 
  ICD-10-CM ICD-9-CM 1. Welcome to Medicare preventive visit Z00.00 V70.0 See separate note, same encounter 2. Encounter for immunization Z23 V03.89 ADMIN PNEUMOCOCCAL VACCINE  
   PNEUMOCOCCAL POLYSACCHARIDE VACCINE, 23-VALENT, ADULT OR IMMUNOSUPPRESSED PT DOSE, 3. ACP (advance care planning) Z71.89 V65.49 4. Hypercholesterolemia E78.00 272.0 CBC W/O DIFF  
   LIPID PANEL  
   METABOLIC PANEL, COMPREHENSIVE 5. Hypothyroidism, unspecified type E03.9 244.9 CBC W/O DIFF  
   LIPID PANEL  
   METABOLIC PANEL, COMPREHENSIVE  
   TSH 3RD GENERATION Fasting labs today Cardiovascular risk and specific lipid/LDL goals reviewed Reviewed diet, nutrition, exercise, weight management, BMI/goals, age/risk based screening recommendations, health maintenance & prevention counseling. Sees gyn for well woman visits/gyn exams q 2 yrs Mammogram screen reportedly negative at [facility]  Colonoscopy screening up to date Reviewed medications and side effects, doing well on current regimen Further follow up & other recommendations pending review of labs.  If all remains good and stable, follow up in 1 yr, sooner prn

## 2019-01-17 LAB
ALBUMIN SERPL-MCNC: 4.7 G/DL (ref 3.6–4.8)
ALBUMIN/GLOB SERPL: 1.8 {RATIO} (ref 1.2–2.2)
ALP SERPL-CCNC: 72 IU/L (ref 39–117)
ALT SERPL-CCNC: 26 IU/L (ref 0–32)
AST SERPL-CCNC: 28 IU/L (ref 0–40)
BILIRUB SERPL-MCNC: 0.4 MG/DL (ref 0–1.2)
BUN SERPL-MCNC: 12 MG/DL (ref 8–27)
BUN/CREAT SERPL: 15 (ref 12–28)
CALCIUM SERPL-MCNC: 9.6 MG/DL (ref 8.7–10.3)
CHLORIDE SERPL-SCNC: 103 MMOL/L (ref 96–106)
CHOLEST SERPL-MCNC: 171 MG/DL (ref 100–199)
CO2 SERPL-SCNC: 23 MMOL/L (ref 20–29)
CREAT SERPL-MCNC: 0.78 MG/DL (ref 0.57–1)
ERYTHROCYTE [DISTWIDTH] IN BLOOD BY AUTOMATED COUNT: 12.9 % (ref 12.3–15.4)
GLOBULIN SER CALC-MCNC: 2.6 G/DL (ref 1.5–4.5)
GLUCOSE SERPL-MCNC: 94 MG/DL (ref 65–99)
HCT VFR BLD AUTO: 40.2 % (ref 34–46.6)
HDLC SERPL-MCNC: 58 MG/DL
HGB BLD-MCNC: 13.3 G/DL (ref 11.1–15.9)
INTERPRETATION, 910389: NORMAL
LDLC SERPL CALC-MCNC: 88 MG/DL (ref 0–99)
MCH RBC QN AUTO: 31.6 PG (ref 26.6–33)
MCHC RBC AUTO-ENTMCNC: 33.1 G/DL (ref 31.5–35.7)
MCV RBC AUTO: 96 FL (ref 79–97)
PLATELET # BLD AUTO: 317 X10E3/UL (ref 150–379)
POTASSIUM SERPL-SCNC: 4.3 MMOL/L (ref 3.5–5.2)
PROT SERPL-MCNC: 7.3 G/DL (ref 6–8.5)
RBC # BLD AUTO: 4.21 X10E6/UL (ref 3.77–5.28)
SODIUM SERPL-SCNC: 142 MMOL/L (ref 134–144)
TRIGL SERPL-MCNC: 127 MG/DL (ref 0–149)
TSH SERPL DL<=0.005 MIU/L-ACNC: 1.91 UIU/ML (ref 0.45–4.5)
VLDLC SERPL CALC-MCNC: 25 MG/DL (ref 5–40)
WBC # BLD AUTO: 13.4 X10E3/UL (ref 3.4–10.8)

## 2019-01-26 ENCOUNTER — TELEPHONE (OUTPATIENT)
Dept: FAMILY MEDICINE CLINIC | Age: 69
End: 2019-01-26

## 2019-01-26 DIAGNOSIS — D72.829 LEUKOCYTOSIS, UNSPECIFIED TYPE: Primary | ICD-10-CM

## 2019-01-27 NOTE — TELEPHONE ENCOUNTER
Liver, kidney, thyroid, electrolytes all good and normal  BS good and normal/non diabetes range  Lipids excellent  Keep up the good work  WBC mildly elevated  The day of her visit she was running a low grade fever and slightly ill after having gotten the shingles vaccine the day prior. So I would like her to RTC for repeat CBC only in about 2-3 weeks. Do not fast. No appt needed. Order pended.   Otherwise next check up in one year and follow up sooner prn

## 2019-02-04 DIAGNOSIS — N32.81 OVERACTIVE BLADDER: ICD-10-CM

## 2019-02-04 DIAGNOSIS — E03.9 HYPOTHYROIDISM, UNSPECIFIED TYPE: ICD-10-CM

## 2019-02-04 DIAGNOSIS — E78.00 HYPERCHOLESTEROLEMIA: ICD-10-CM

## 2019-02-04 RX ORDER — ATORVASTATIN CALCIUM 10 MG/1
TABLET, FILM COATED ORAL
Qty: 90 TAB | Refills: 3 | Status: SHIPPED | OUTPATIENT
Start: 2019-02-04 | End: 2020-02-08 | Stop reason: SDUPTHER

## 2019-02-04 RX ORDER — LEVOTHYROXINE SODIUM 50 UG/1
TABLET ORAL
Qty: 90 TAB | Refills: 3 | Status: SHIPPED | OUTPATIENT
Start: 2019-02-04 | End: 2020-02-08 | Stop reason: SDUPTHER

## 2019-02-04 RX ORDER — TOLTERODINE 4 MG/1
CAPSULE, EXTENDED RELEASE ORAL
Qty: 90 CAP | Refills: 3 | Status: SHIPPED | OUTPATIENT
Start: 2019-02-04 | End: 2020-02-08 | Stop reason: SDUPTHER

## 2019-02-22 ENCOUNTER — HOSPITAL ENCOUNTER (OUTPATIENT)
Dept: LAB | Age: 69
Discharge: HOME | End: 2019-02-22
Payer: MEDICARE

## 2019-02-22 DIAGNOSIS — D72.829 LEUKOCYTOSIS, UNSPECIFIED TYPE: ICD-10-CM

## 2019-02-22 PROCEDURE — 85025 COMPLETE CBC W/AUTO DIFF WBC: CPT

## 2019-02-22 PROCEDURE — 36415 COLL VENOUS BLD VENIPUNCTURE: CPT

## 2019-02-23 LAB
BASOPHILS # BLD AUTO: 0 X10E3/UL (ref 0–0.2)
BASOPHILS NFR BLD AUTO: 1 %
EOSINOPHIL # BLD AUTO: 0.3 X10E3/UL (ref 0–0.4)
EOSINOPHIL NFR BLD AUTO: 3 %
ERYTHROCYTE [DISTWIDTH] IN BLOOD BY AUTOMATED COUNT: 13 % (ref 12.3–15.4)
HCT VFR BLD AUTO: 36.9 % (ref 34–46.6)
HGB BLD-MCNC: 12.5 G/DL (ref 11.1–15.9)
IMM GRANULOCYTES # BLD AUTO: 0 X10E3/UL (ref 0–0.1)
IMM GRANULOCYTES NFR BLD AUTO: 0 %
LYMPHOCYTES # BLD AUTO: 2.4 X10E3/UL (ref 0.7–3.1)
LYMPHOCYTES NFR BLD AUTO: 28 %
MCH RBC QN AUTO: 31.6 PG (ref 26.6–33)
MCHC RBC AUTO-ENTMCNC: 33.9 G/DL (ref 31.5–35.7)
MCV RBC AUTO: 93 FL (ref 79–97)
MONOCYTES # BLD AUTO: 1 X10E3/UL (ref 0.1–0.9)
MONOCYTES NFR BLD AUTO: 12 %
NEUTROPHILS # BLD AUTO: 4.6 X10E3/UL (ref 1.4–7)
NEUTROPHILS NFR BLD AUTO: 56 %
PLATELET # BLD AUTO: 337 X10E3/UL (ref 150–379)
RBC # BLD AUTO: 3.96 X10E6/UL (ref 3.77–5.28)
WBC # BLD AUTO: 8.3 X10E3/UL (ref 3.4–10.8)

## 2019-02-24 ENCOUNTER — TELEPHONE (OUTPATIENT)
Dept: FAMILY MEDICINE CLINIC | Age: 69
End: 2019-02-24

## 2019-12-30 ENCOUNTER — OFFICE VISIT (OUTPATIENT)
Dept: FAMILY MEDICINE CLINIC | Age: 69
End: 2019-12-30

## 2019-12-30 VITALS
OXYGEN SATURATION: 95 % | WEIGHT: 136.4 LBS | HEART RATE: 94 BPM | BODY MASS INDEX: 24.17 KG/M2 | DIASTOLIC BLOOD PRESSURE: 80 MMHG | HEIGHT: 63 IN | RESPIRATION RATE: 18 BRPM | SYSTOLIC BLOOD PRESSURE: 126 MMHG | TEMPERATURE: 98.6 F

## 2019-12-30 DIAGNOSIS — R09.81 HEAD CONGESTION: ICD-10-CM

## 2019-12-30 DIAGNOSIS — R05.9 COUGH: Primary | ICD-10-CM

## 2019-12-30 RX ORDER — METHYLPREDNISOLONE 4 MG/1
TABLET ORAL
Qty: 1 DOSE PACK | Refills: 0 | Status: SHIPPED | OUTPATIENT
Start: 2019-12-30 | End: 2020-02-06 | Stop reason: ALTCHOICE

## 2019-12-30 NOTE — PROGRESS NOTES
Chief Complaint   Patient presents with    Cold Symptoms     x 6 weeks      1. Have you been to the ER, urgent care clinic since your last visit? Hospitalized since your last visit? No    2. Have you seen or consulted any other health care providers outside of the [address] [place] [address] since your last visit? Include any pap smears or colon screening.  No

## 2019-12-30 NOTE — PROGRESS NOTES
HISTORY OF PRESENT ILLNESS  Jennifer Modi is a 81 y.o. female. HPI: Patient is complaining of head congestion, scratchy throat, PND and cough x 2-3 week. Denies purulent nasal discharge. Taking OTC medications without help. Past Medical History:   Diagnosis Date    ACP (advance care planning) 1/16/2019    Discussion 1-2019. Patient has an AMD. Asked to provide copy for file.      AR (allergic rhinitis) 7/31/2002    Family history of MI (myocardial infarction) 5/19/2010    H/O Transient Elevated liver enzymes ~2007, w/u Negative 5/13/2011    Hypercholesterolemia 5/13/2011    Hypothyroidism 5/19/2010    Osteopenia 5/19/2010    Overactive bladder 5/13/2011     Past Surgical History:   Procedure Laterality Date    COLONOSCOPY  2/2003    normal, but f/u 5 yr; Dr Paul Rojas  2011    ok x 10 yrs, Dr Momo Jacques EKG ORDERABLE  1/18/2010    Normal, NSR, rate 76    HM DEXA SCAN  12/2009    Osteopenia, per Gyn    [address] [place] [address]    for infertility eval    HX SALPINGO-OOPHORECTOMY  1970's    for ectopic pregnancy    COOPER MAMMOGRAM SCREEN BILAT  VPI q yr    annually per Dr Cranford Cockayne [facility]BD [facility]  9/[facility], [facility]    normal     Allergies   Allergen Reactions    Bactrim [Sulfamethoprim Ds] Hives    Other Medication Nausea Only     Lipitor 20mg dose- caused GI upset 5-2010    Vytorin 10-10 [Ezetimibe-Simvastatin] Other (comments)     Elevated LFT's     Current Outpatient Medications:     methylPREDNISolone (MEDROL DOSEPACK) 4 mg tablet, Use as directed, Disp: 1 Dose Pack, Rfl: 0    tolterodine ER (DETROL LA) 4 mg ER capsule, TAKE 1 CAPSULE DAILY FOR BLADDER HYPERACTIVITY, Disp: 90 Cap, Rfl: 3    levothyroxine (SYNTHROID) 50 mcg tablet, TAKE 1 TABLET DAILY BEFORE BREAKFAST FOR HYPOTHYROIDISM, Disp: 90 Tab, Rfl: 3    atorvastatin (LIPITOR) 10 mg tablet, TAKE 1 TABLET DAILY FOR HYPERCHOLESTEROLEMIA, Disp: 90 Tab, Rfl: 3    multivitamin (ONE A DAY) tablet, Take 1 Tab by mouth daily. , Disp: , Rfl:     aspirin 81 mg tablet, Take  by mouth., Disp: , Rfl:     CALCIUM CARBONATE/VITAMIN D3 (CALCIUM 600 + D PO), Take  by mouth daily. , Disp: , Rfl:     fish oil-dha-epa (FISH OIL) 1,200-144-216 mg Cap, Take  by mouth daily. , Disp: , Rfl:     mometasone (NASONEX) 50 mcg/actuation nasal spray, 2 Sprays by Both Nostrils route daily. (Patient taking differently: 2 Sprays by Both Nostrils route daily as needed.), Disp: 3 Container, Rfl: 1  Review of Systems   Constitutional: Negative. HENT: Positive for congestion and sore throat. Respiratory: Positive for cough. Cardiovascular: Negative. Gastrointestinal: Negative. Blood pressure 126/80, pulse 94, temperature 98.6 °F (37 °C), temperature source Oral, resp. rate 18, height 5' 3.1\" (1.603 m), weight 136 lb 6.4 oz (61.9 kg), SpO2 95 %. Physical Exam  Constitutional:       Appearance: She is normal weight. HENT:      Mouth/Throat:      Mouth: Mucous membranes are moist.      Pharynx: Oropharynx is clear. Neck:      Musculoskeletal: Normal range of motion and neck supple. Cardiovascular:      Rate and Rhythm: Normal rate and regular rhythm. Pulses: Normal pulses. Heart sounds: Murmur present. Pulmonary:      Effort: No respiratory distress. Breath sounds: No stridor. Wheezing present. No rhonchi or rales. Chest:      Chest wall: No tenderness. Abdominal:      General: Bowel sounds are normal.      Palpations: Abdomen is soft. Neurological:      Mental Status: She is alert. ASSESSMENT and PLAN  Diagnoses and all orders for this visit:    1. Cough  -     methylPREDNISolone (MEDROL DOSEPACK) 4 mg tablet; Use as directed    2.  Head congestion  Pt was given an after visit summary which includes diagnosis, current medicines and vital and voiced understanding of treatment plan

## 2020-02-06 ENCOUNTER — HOSPITAL ENCOUNTER (OUTPATIENT)
Dept: LAB | Age: 70
Discharge: HOME | End: 2020-02-06
Payer: MEDICARE

## 2020-02-06 ENCOUNTER — OFFICE VISIT (OUTPATIENT)
Dept: FAMILY MEDICINE CLINIC | Age: 70
End: 2020-02-06

## 2020-02-06 VITALS
SYSTOLIC BLOOD PRESSURE: 136 MMHG | DIASTOLIC BLOOD PRESSURE: 84 MMHG | TEMPERATURE: 98 F | BODY MASS INDEX: 24.45 KG/M2 | RESPIRATION RATE: 16 BRPM | HEIGHT: 63 IN | OXYGEN SATURATION: 98 % | HEART RATE: 88 BPM | WEIGHT: 138 LBS

## 2020-02-06 DIAGNOSIS — Z00.00 INITIAL MEDICARE ANNUAL WELLNESS VISIT: Primary | ICD-10-CM

## 2020-02-06 DIAGNOSIS — H26.9 CATARACT OF BOTH EYES, UNSPECIFIED CATARACT TYPE: ICD-10-CM

## 2020-02-06 DIAGNOSIS — E78.00 HYPERCHOLESTEROLEMIA: ICD-10-CM

## 2020-02-06 DIAGNOSIS — Z01.818 PREOP EXAMINATION: ICD-10-CM

## 2020-02-06 DIAGNOSIS — E03.9 HYPOTHYROIDISM, UNSPECIFIED TYPE: ICD-10-CM

## 2020-02-06 PROCEDURE — 80053 COMPREHEN METABOLIC PANEL: CPT

## 2020-02-06 PROCEDURE — 84443 ASSAY THYROID STIM HORMONE: CPT

## 2020-02-06 PROCEDURE — 84439 ASSAY OF FREE THYROXINE: CPT

## 2020-02-06 PROCEDURE — 80061 LIPID PANEL: CPT

## 2020-02-06 PROCEDURE — 85025 COMPLETE CBC W/AUTO DIFF WBC: CPT

## 2020-02-06 RX ORDER — ATORVASTATIN CALCIUM 10 MG/1
TABLET, FILM COATED ORAL
Qty: 90 TAB | Refills: 3 | Status: CANCELLED | OUTPATIENT
Start: 2020-02-06

## 2020-02-06 RX ORDER — OFLOXACIN 3 MG/ML
SOLUTION/ DROPS OPHTHALMIC
COMMUNITY
Start: 2020-01-16 | End: 2021-01-26 | Stop reason: ALTCHOICE

## 2020-02-06 RX ORDER — TOLTERODINE 4 MG/1
CAPSULE, EXTENDED RELEASE ORAL
Qty: 90 CAP | Refills: 3 | Status: CANCELLED | OUTPATIENT
Start: 2020-02-06

## 2020-02-06 RX ORDER — LEVOTHYROXINE SODIUM 50 UG/1
TABLET ORAL
Qty: 90 TAB | Refills: 3 | Status: CANCELLED | OUTPATIENT
Start: 2020-02-06

## 2020-02-06 RX ORDER — PREDNISOLONE ACETATE 10 MG/ML
SUSPENSION/ DROPS OPHTHALMIC
COMMUNITY
Start: 2020-01-16 | End: 2021-01-26 | Stop reason: ALTCHOICE

## 2020-02-06 RX ORDER — KETOROLAC TROMETHAMINE 5 MG/ML
SOLUTION OPHTHALMIC
COMMUNITY
Start: 2020-01-16 | End: 2021-01-26 | Stop reason: ALTCHOICE

## 2020-02-06 NOTE — PROGRESS NOTES
Chief Complaint   Patient presents with    Annual Wellness Visit     pt due for     Medication Refill    Surg H&P     pt having cataract surgery on 2/26/20 and 3/4/20 with Dr. Mandy Monteiro DOCUMENTATION\"  1. Have you been to the ER, urgent care clinic since your last visit? Hospitalized since your last visit? No    2. Have you seen or consulted any other health care providers outside of the [address] [place] [address] since your last visit? Include any pap smears or colon screening.  Yes Reason for visit: eye doctor for cataract surgery

## 2020-02-06 NOTE — PROGRESS NOTES
Chief Complaint   Patient presents with   [facility] Visit, Medicare         Pre-op Exam     pt having cataract surgery on 2/26/20 and 3/4/20 with Dr. Radha Leyva Cholesterol Problem     fasting follow up    Thyroid Problem     follow up     HISTORY OF PRESENT ILLNESS   HPI  PRE-OP H&P    Surgery: Cataracts, Both Eyes    Date of Surgery: 2- OD, 3-4-2020 OS    Surgeon: Dr. Abdirashid Saeed    Anesthesia: Local w/ MAC    Latex Allergy: No    History of Reactions to Anesthesia: No    History of Heart Disease/CAD/PTCA/CABG: No    ASA: 81 mg    Coumadin/Xarelto/Eliquis/Pradaxa: No    Nsaid's: No    Asthma/COPD/Pulmonary Disease: No    Bleeding/Bruising or Clotting Disorder: No    H/O DVT or Pulmonary Embolism: No    Other pertinent ROS noted on pre-op form: Negative     REVIEW OF SYMPTOMS   Review of Systems   Constitutional: Negative. HENT: Negative. Eyes: Negative. Respiratory: Negative. Cardiovascular: Negative. Gastrointestinal: Negative. Genitourinary: Negative. Musculoskeletal: Negative. Skin: Negative. Neurological: Negative. Endo/Heme/Allergies: Negative. Psychiatric/Behavioral: Negative. PROBLEM LIST/MEDICAL HISTORY     Problem List  Date Reviewed: 2/6/2020          Codes Class Noted    ACP (advance care planning) ICD-10-CM: Z71.89  ICD-9-CM: V65.49  1/16/2019    Overview Signed 1/16/2019  9:56 AM by Khoi Brower MD     Discussion [phone]. Patient has an AMD. Asked to provide copy for file.               Hypercholesterolemia ICD-10-CM: E78.00  ICD-9-CM: 272.0  5/13/2011        H/O Transient Elevated liver enzymes ~2007, w/u Negative ICD-10-CM: R74.8  ICD-9-CM: 790.5  5/13/2011    Overview Signed 5/13/2011  1:06 PM by Khoi Brower MD     2007, Hepatitis panel, GI w/u  and U/s negative             Overactive bladder ICD-10-CM: N32.81  ICD-9-CM: 596.51  5/13/2011        Hypothyroidism ICD-10-CM: E03.9  ICD-9-CM: 244.9  5/19/2010    Overview Signed 5/19/2010  3:46 PM by Ajay Chavez [address] [facility]             Family history of MI (myocardial infarction) ICD-10-CM: Z82.49  ICD-9-CM: V17.3  5/19/2010    Overview Signed 5/19/2010  3:46 PM by Alice Francois     Paternal at age 51 yo             Osteopenia ICD-10-CM: M85.80  ICD-9-CM: 733.90  5/19/2010    Overview Addendum 1/15/2018 10:52 AM by Diana Santacruz MD     Dexa per Gyn Dr Melania Arrieta 12/2009, q 3 yrs             AR (allergic rhinitis) ICD-10-CM: J30.9  ICD-9-CM: 477.9  7/31/2002    Overview Addendum 8/28/2014  8:36 AM by Diana Santacruz MD     ~4359; Fall worse season                       PAST SURGICAL HISTORY     Past Surgical History:   Procedure Laterality Date    COLONOSCOPY  2/2003    normal, but f/u 5 yr; Dr Alen Olivares  2011    ok x 10 yrs, Dr Cronin Fast EKG ORDERABLE  1/18/2010    Normal, NSR, rate 76    HM DEXA SCAN  12/2009    Osteopenia, per Gyn    [address] [place] [address] [address]    for infertility eval    HX SALPINGO-OOPHORECTOMY  1970's    for ectopic pregnancy    COOPER MAMMOGRAM SCREEN BILAT  VPI q yr    annually per Dr Liz Chester [facility]BD COMP  9/2007, [facility]    normal         MEDICATIONS     Current Outpatient Medications   Medication Sig    tolterodine ER (DETROL LA) 4 mg ER capsule TAKE 1 CAPSULE DAILY FOR BLADDER HYPERACTIVITY    levothyroxine (SYNTHROID) 50 mcg tablet TAKE 1 TABLET DAILY BEFORE BREAKFAST FOR HYPOTHYROIDISM    atorvastatin (LIPITOR) 10 mg tablet TAKE 1 TABLET DAILY FOR HYPERCHOLESTEROLEMIA    multivitamin (ONE A DAY) tablet Take 1 Tab by mouth daily.  aspirin 81 mg tablet Take  by mouth.  CALCIUM CARBONATE/VITAMIN D3 (CALCIUM 600 + D PO) Take  by mouth daily.  fish oil-dha-epa (FISH OIL) 1,200-144-216 mg Cap Take  by mouth daily.     ketorolac (ACULAR) 0.5 % ophthalmic solution INSTILL 1 GTT INTO EYE HAVING SURGERY QID STARTING 2 DAYS PRIOR TO SURGERY    ofloxacin (FLOXIN) 0.3 % ophthalmic solution INT 1 GTT IN SURGERY EYE FOUR TIMES DAILY. START 2 DAYS B SURGERY    prednisoLONE acetate (PRED FORTE) 1 % ophthalmic suspension INSTILL 1 GTT INTO SURGERY EYE QID. TAPER UTD    mometasone (NASONEX) 50 mcg/actuation nasal spray 2 Sprays by Both Nostrils route daily. (Patient taking differently: 2 Sprays by Both Nostrils route daily as needed.)     No current facility-administered medications for this visit.            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   Tobacco Use    Smoking status: Never Smoker    Smokeless tobacco: Never Used   Substance and Sexual Activity    Alcohol use: Yes     Comment: occasional wine    Drug use: No    Sexual activity: Yes     Partners: Male   Other Topics Concern     Service No    Blood Transfusions No    Caffeine Concern No     Comment: 2 cups of coffee qd    Occupational Exposure No    Hobby Hazards No    Sleep Concern No    Stress Concern No    Weight Concern No     Comment: pretty stable, runs btw 130-135 over the yrs    Special Diet No    Back Care No    Exercise Yes     Comment: walks dog qd x 30-60 minutes    Bike Helmet No    Seat Belt Yes    Self-Exams Yes   Social History Narrative    2 adopted children; her daughter, son in law and new grandbaby live w her now        IMMUNIZATIONS  Immunization History   Administered Date(s) Administered    Influenza High Dose Vaccine PF 10/10/2019    Influenza Vaccine 10/06/2014, 10/22/2016, 10/17/2017, 10/16/2018    Influenza Vaccine (Tri) Adjuvanted 10/11/2019    Pneumococcal Conjugate (PCV-13) 01/15/2018    Pneumococcal Polysaccharide (PPSV-23) 2019    TD Vaccine 2010    Tdap 2016    Zoster Recombinant 10/10/2018, 01/15/2019    Zoster Vaccine, Live 2014         FAMILY HISTORY     Family History   Problem Relation Age of Onset    Breast Cancer Mother         dx in her mid 66's, survivor    Hypertension Mother     Stroke Mother     Thyroid Disease Mother     Heart Disease Mother          of pneumonia age 80    Heart Disease Father     Heart Attack Father 52    Breast Cancer Sister 48        survivor    High Cholesterol Sister     High Cholesterol Brother     High Cholesterol Brother          VITALS     Visit Vitals  /84   Pulse 88   Temp 98 °F (36.7 °C)   Resp 16   Ht 5' 3\" (1.6 m)   Wt 138 lb (62.6 kg)   SpO2 98%   BMI 24.45 kg/m²          PHYSICAL EXAMINATION   Physical Exam  Vitals signs reviewed. Constitutional:       General: She is not in acute distress. Appearance: Normal appearance. HENT:      Right Ear: Tympanic membrane normal.      Left Ear: Tympanic membrane normal.      Nose: Nose normal.      Mouth/Throat:      Mouth: Mucous membranes are moist.      Pharynx: Oropharynx is clear. No oropharyngeal exudate. Eyes:      Extraocular Movements: Extraocular movements intact. Conjunctiva/sclera: Conjunctivae normal.   Neck:      Musculoskeletal: Neck supple. Thyroid: No thyroid mass, thyromegaly or thyroid tenderness. Vascular: No carotid bruit. Cardiovascular:      Rate and Rhythm: Normal rate and regular rhythm. Pulses: Normal pulses. Heart sounds: Normal heart sounds. No murmur. No gallop. Pulmonary:      Effort: Pulmonary effort is normal.      Breath sounds: Normal breath sounds. Abdominal:      General: Bowel sounds are normal. There is no distension. Palpations: Abdomen is soft. There is no mass.    Musculoskeletal: Normal range of motion. General: No swelling or tenderness. Right lower leg: No edema. Left lower leg: No edema. Lymphadenopathy:      Cervical: No cervical adenopathy. Skin:     General: Skin is warm and dry. Neurological:      General: No focal deficit present. Mental Status: She is oriented to person, place, and time. Cranial Nerves: No cranial nerve deficit. Motor: No weakness. Coordination: Coordination normal.   Psychiatric:         Mood and Affect: Mood normal.        ASSESSMENT & PLAN       ICD-10-CM ICD-9-CM    1. Initial Medicare annual wellness visit Z00.00 V70.0 See separate note under this same encounter visit for Medicare Wellness note. 2. Preop H&P for Cataract Surgery Both Eyes: 2- OD, 3-4-2020 OS; Per Dr. Fredrick Rhodes under Local w/ MAC Q86.757 V72.84 Medically stable and cleared for surgery. Low surgical risk. Pre-Op Form completed. Fax today's note w/ pre-op form to # provided   3. Cataract of both eyes, unspecified cataract type H26.9 366.9    4. Hypercholesterolemia E78.00 272.0 CBC WITH AUTOMATED DIFF      LIPID PANEL      METABOLIC PANEL, COMPREHENSIVE   5. Hypothyroidism, unspecified type E03.9 244.9 TSH 3RD GENERATION      T4, FREE     Fasting labs today  Cardiovascular risk and specific lipid/LDL/BP goals reviewed  Reviewed diet, nutrition, exercise, weight management, BMI/goals, age/risk based screening recommendations, health maintenance & prevention counseling. Cancer screening USPTFS guidelines reviewed w/ pt today. Discussed benefits/positive/negative outcomes of screening based on age/risk stratification. Informed consent for/against screening based on pt's personal hx/risk factors. Updated in history above and health maintenance. Sees gyn for well woman visits/gyn exams, mammogram screening orders, Dexa screenings. All reportedly up to date and normal/stable.   Reviewed medications and side effects, doing well on current regimen and will need all her rx's renewed for 90 day supply to her [employer] order company after review of today's labs  Further follow up & other recommendations pending review of labs.  If all remains good and stable, follow up in 1 yr, sooner prn

## 2020-02-06 NOTE — PATIENT INSTRUCTIONS
Medicare Wellness Visit, Female The best way to live healthy is to have a lifestyle where you eat a well-balanced diet, exercise regularly, limit alcohol use, and quit all forms of tobacco/nicotine, if applicable. Regular preventive services are another way to keep healthy. Preventive services (vaccines, screening tests, monitoring & exams) can help personalize your care plan, which helps you manage your own care. Screening tests can find health problems at the earliest stages, when they are easiest to treat. [facility] follows the current, evidence-based guidelines published by the [facility] Frankie Bullock ([facility]) when recommending preventive services for our patients. Because we follow these guidelines, sometimes recommendations change over time as research supports it. (For example, mammograms used to be recommended annually. Even though Medicare will still pay for an annual mammogram, the newer guidelines recommend a mammogram every two years for women of average risk). Of course, you and your doctor may decide to screen more often for some diseases, based on your risk and your co-morbidities (chronic disease you are already diagnosed with). Preventive services for you include: - Medicare offers their members a free annual wellness visit, which is time for you and your primary care provider to discuss and plan for your preventive service needs. Take advantage of this benefit every year! 
-All adults over the age of 72 should receive the recommended pneumonia vaccines. Current USPSTF guidelines recommend a series of two vaccines for the best pneumonia protection.  
-All adults should have a flu vaccine yearly and a tetanus vaccine every 10 years.  
-All adults age 48 and older should receive the shingles vaccines (series of two vaccines). -All adults age 38-68 who are overweight should have a diabetes screening test once every three years. -All adults born between [birth year] and 1965 should be screened once for Hepatitis C. 
-Other screening tests and preventive services for persons with diabetes include: an eye exam to screen for diabetic retinopathy, a kidney function test, a foot exam, and stricter control over your cholesterol.  
-Cardiovascular screening for adults with routine risk involves an electrocardiogram (ECG) at intervals determined by your doctor.  
-Colorectal cancer screenings should be done for adults age 54-65 with no increased risk factors for colorectal cancer. There are a number of acceptable methods of screening for this type of cancer. Each test has its own benefits and drawbacks. Discuss with your doctor what is most appropriate for you during your annual wellness visit. The different tests include: colonoscopy (considered the best screening method), a fecal occult blood test, a fecal DNA test, and sigmoidoscopy. 
 
-A bone mass density test is recommended when a woman turns 65 to screen for osteoporosis. This test is only recommended one time, as a screening. Some providers will use this same test as a disease monitoring tool if you already have osteoporosis. -Breast cancer screenings are recommended every other year for women of normal risk, age 54-69. 
-Cervical cancer screenings for women over age 72 are only recommended with certain risk factors. Here is a list of your current Health Maintenance items (your personalized list of preventive services) with a due date: 
Health Maintenance Due Topic Date Due  Cholesterol Test   01/16/2020

## 2020-02-06 NOTE — PROGRESS NOTES
This is an Initial Medicare Annual Wellness Exam (AWV) (Performed 12 months after IPPE or effective date of Medicare Part B enrollment, Once in a lifetime)    I have reviewed the patient's medical history in detail and updated the computerized patient record. History     Patient Active Problem List   Diagnosis Code    Hypothyroidism E03.9    Family history of MI (myocardial infarction) Z80.55    AR (allergic rhinitis) J30.9    Osteopenia M85.80    Hypercholesterolemia E78.00    H/O Transient Elevated liver enzymes ~2007, w/u Negative R74.8    Overactive bladder N32.81    ACP (advance care planning) Z71.89     Past Medical History:   Diagnosis Date    ACP (advance care planning) 1/16/2019    Discussion 1-2019. Patient has an AMD. Asked to provide copy for file.      AR (allergic rhinitis) 7/31/2002    Family history of MI (myocardial infarction) 5/19/2010    H/O Transient Elevated liver enzymes ~2007, w/u Negative 5/13/2011    Hypercholesterolemia 5/13/2011    Hypothyroidism 5/19/2010    Osteopenia 5/19/2010    Overactive bladder 5/13/2011      Past Surgical History:   Procedure Laterality Date    COLONOSCOPY  2/2003    normal, but f/u 5 yr; Dr Pendleton Living  2011    ok x 10 yrs, Dr Latisha Arce EKG ORDERABLE  1/18/2010    Normal, NSR, rate 76    HM DEXA SCAN  12/2009    Osteopenia, per Gyn    [address] [place] [address] [address]    for infertility eval    HX SALPINGO-OOPHORECTOMY  1970's    for ectopic pregnancy    COOPER MAMMOGRAM SCREEN BILAT  VPI q yr    annually per Dr Parish Rios [facility]BD COMP  9/2007, [facility]    normal     Current Outpatient Medications   Medication Sig Dispense Refill    tolterodine ER (DETROL LA) 4 mg ER capsule TAKE 1 CAPSULE DAILY FOR BLADDER HYPERACTIVITY 90 Cap 3    levothyroxine (SYNTHROID) 50 mcg tablet TAKE 1 TABLET DAILY BEFORE BREAKFAST FOR HYPOTHYROIDISM 90 Tab 3    atorvastatin (LIPITOR) 10 mg tablet TAKE 1 TABLET DAILY FOR HYPERCHOLESTEROLEMIA 90 Tab 3    multivitamin (ONE A DAY) tablet Take 1 Tab by mouth daily.  aspirin 81 mg tablet Take  by mouth.  CALCIUM CARBONATE/VITAMIN D3 (CALCIUM 600 + D PO) Take  by mouth daily.  fish oil-dha-epa (FISH OIL) 1,200-144-216 mg Cap Take  by mouth daily.  ketorolac (ACULAR) 0.5 % ophthalmic solution INSTILL 1 GTT INTO EYE HAVING SURGERY QID STARTING 2 DAYS PRIOR TO SURGERY      ofloxacin (FLOXIN) 0.3 % ophthalmic solution INT 1 GTT IN SURGERY EYE FOUR TIMES DAILY. START 2 DAYS B SURGERY      prednisoLONE acetate (PRED FORTE) 1 % ophthalmic suspension INSTILL 1 GTT INTO SURGERY EYE QID. TAPER UTD      mometasone (NASONEX) 50 mcg/actuation nasal spray 2 Sprays by Both Nostrils route daily.  (Patient taking differently: 2 Sprays by Both Nostrils route daily as needed.) 3 Container 1     Allergies   Allergen Reactions    Bactrim [Sulfamethoprim Ds] Hives    Vytorin 10-10 [Ezetimibe-Simvastatin] Other (comments)     Elevated LFT's    Other Medication Nausea Only     Lipitor 20mg dose- caused GI upset        Family History   Problem Relation Age of Onset    Breast Cancer Mother         dx in her mid 66's, survivor    Hypertension Mother     Stroke Mother     Thyroid Disease Mother     Heart Disease Mother          of pneumonia age 80    Heart Disease Father     Heart Attack Father 52    Breast Cancer Sister 48        survivor    High Cholesterol Sister     High Cholesterol Brother     High Cholesterol Brother      Social History     Tobacco Use    Smoking status: Never Smoker    Smokeless tobacco: Never Used   Substance Use Topics    Alcohol use: Yes     Comment: occasional wine       Depression Risk Factor Screening:     3 most recent PHQ Screens 2020   PHQ Not Done -   Little interest or pleasure in doing things Not at all   Feeling down, depressed, irritable, or hopeless Not at all   Total Score PHQ 2 0       Alcohol Risk Factor Screening:   Do you average 1 drink per night or more than 7 drinks a week:  No    On any one occasion in the past three months have you have had more than 3 drinks containing alcohol:  No      Functional Ability and Level of Safety:   Hearing: Hearing is good. Activities of Daily Living: The home contains: no safety equipment. Patient does total self care     ADL Assessment 2/6/2020   Feeding yourself No Help Needed   Getting from bed to chair No Help Needed   Getting dressed No Help Needed   Bathing or showering No Help Needed   Walk across the room (includes cane/walker) No Help Needed   Using the telphone No Help Needed   Taking your medications No Help Needed   Preparing meals No Help Needed   Managing money (expenses/bills) No Help Needed   Moderately strenuous housework (laundry) No Help Needed   Shopping for personal items (toiletries/medicines) No Help Needed   Shopping for groceries No Help Needed   Driving No Help Needed   Climbing a flight of stairs No Help Needed   Getting to places beyond walking distances No Help Needed       Ambulation: with no difficulty    Fall Risk:  Fall Risk Assessment, last 12 mths 2/6/2020   Able to walk? Yes   Fall in past 12 months? No       Abuse Screen:  Patient is not abused    Cognitive Screening   Has your family/caregiver stated any concerns about your memory: no      Patient Care Team   Patient Care Team:  Mike Banegas MD as PCP - General  Mike Banegas MD as PCP - [facility] Empaneled Provider  Magui Harrington MD (Obstetrics & Gynecology)  Annette Ramirez MD (Obstetrics & Gynecology)    Assessment/Plan   Education and counseling provided:  Are appropriate based on today's review and evaluation    Diagnoses and all orders for this visit:    1.  Initial Medicare annual wellness visit         Health Maintenance Due   Topic Date Due    Lipid Screen  01/16/2020

## 2020-02-07 LAB
ALBUMIN SERPL-MCNC: 4.4 G/DL (ref 3.8–4.8)
ALBUMIN/GLOB SERPL: 2 {RATIO} (ref 1.2–2.2)
ALP SERPL-CCNC: 72 IU/L (ref 39–117)
ALT SERPL-CCNC: 17 IU/L (ref 0–32)
AST SERPL-CCNC: 18 IU/L (ref 0–40)
BASOPHILS # BLD AUTO: 0.1 X10E3/UL (ref 0–0.2)
BASOPHILS NFR BLD AUTO: 1 %
BILIRUB SERPL-MCNC: 0.3 MG/DL (ref 0–1.2)
BUN SERPL-MCNC: 16 MG/DL (ref 8–27)
BUN/CREAT SERPL: 21 (ref 12–28)
CALCIUM SERPL-MCNC: 9.4 MG/DL (ref 8.7–10.3)
CHLORIDE SERPL-SCNC: 103 MMOL/L (ref 96–106)
CHOLEST SERPL-MCNC: 166 MG/DL (ref 100–199)
CO2 SERPL-SCNC: 26 MMOL/L (ref 20–29)
CREAT SERPL-MCNC: 0.76 MG/DL (ref 0.57–1)
EOSINOPHIL # BLD AUTO: 0.2 X10E3/UL (ref 0–0.4)
EOSINOPHIL NFR BLD AUTO: 3 %
ERYTHROCYTE [DISTWIDTH] IN BLOOD BY AUTOMATED COUNT: 12.4 % (ref 11.7–15.4)
GLOBULIN SER CALC-MCNC: 2.2 G/DL (ref 1.5–4.5)
GLUCOSE SERPL-MCNC: 92 MG/DL (ref 65–99)
HCT VFR BLD AUTO: 38.6 % (ref 34–46.6)
HDLC SERPL-MCNC: 52 MG/DL
HGB BLD-MCNC: 12.7 G/DL (ref 11.1–15.9)
IMM GRANULOCYTES # BLD AUTO: 0 X10E3/UL (ref 0–0.1)
IMM GRANULOCYTES NFR BLD AUTO: 0 %
INTERPRETATION, 910389: NORMAL
LDLC SERPL CALC-MCNC: 83 MG/DL (ref 0–99)
LYMPHOCYTES # BLD AUTO: 1.7 X10E3/UL (ref 0.7–3.1)
LYMPHOCYTES NFR BLD AUTO: 24 %
MCH RBC QN AUTO: 31.4 PG (ref 26.6–33)
MCHC RBC AUTO-ENTMCNC: 32.9 G/DL (ref 31.5–35.7)
MCV RBC AUTO: 95 FL (ref 79–97)
MONOCYTES # BLD AUTO: 0.8 X10E3/UL (ref 0.1–0.9)
MONOCYTES NFR BLD AUTO: 11 %
NEUTROPHILS # BLD AUTO: 4.4 X10E3/UL (ref 1.4–7)
NEUTROPHILS NFR BLD AUTO: 61 %
PLATELET # BLD AUTO: 338 X10E3/UL (ref 150–450)
POTASSIUM SERPL-SCNC: 4.1 MMOL/L (ref 3.5–5.2)
PROT SERPL-MCNC: 6.6 G/DL (ref 6–8.5)
RBC # BLD AUTO: 4.05 X10E6/UL (ref 3.77–5.28)
SODIUM SERPL-SCNC: 143 MMOL/L (ref 134–144)
T4 FREE SERPL-MCNC: 1.21 NG/DL (ref 0.82–1.77)
TRIGL SERPL-MCNC: 156 MG/DL (ref 0–149)
TSH SERPL DL<=0.005 MIU/L-ACNC: 2.55 UIU/ML (ref 0.45–4.5)
VLDLC SERPL CALC-MCNC: 31 MG/DL (ref 5–40)
WBC # BLD AUTO: 7.2 X10E3/UL (ref 3.4–10.8)

## 2020-02-08 ENCOUNTER — PATIENT MESSAGE (OUTPATIENT)
Dept: FAMILY MEDICINE CLINIC | Age: 70
End: 2020-02-08

## 2020-02-08 DIAGNOSIS — E03.9 HYPOTHYROIDISM, UNSPECIFIED TYPE: ICD-10-CM

## 2020-02-08 DIAGNOSIS — N32.81 OVERACTIVE BLADDER: ICD-10-CM

## 2020-02-08 DIAGNOSIS — E78.00 HYPERCHOLESTEROLEMIA: ICD-10-CM

## 2020-02-08 RX ORDER — LEVOTHYROXINE SODIUM 50 UG/1
TABLET ORAL
Qty: 90 TAB | Refills: 3 | Status: SHIPPED | OUTPATIENT
Start: 2020-02-08 | End: 2021-01-26 | Stop reason: SDUPTHER

## 2020-02-08 RX ORDER — TOLTERODINE 4 MG/1
CAPSULE, EXTENDED RELEASE ORAL
Qty: 90 CAP | Refills: 3 | Status: SHIPPED | OUTPATIENT
Start: 2020-02-08 | End: 2021-01-26 | Stop reason: SDUPTHER

## 2020-02-08 RX ORDER — ATORVASTATIN CALCIUM 10 MG/1
TABLET, FILM COATED ORAL
Qty: 90 TAB | Refills: 3 | Status: SHIPPED | OUTPATIENT
Start: 2020-02-08 | End: 2021-01-26 | Stop reason: SDUPTHER

## 2020-12-27 DIAGNOSIS — E78.00 HYPERCHOLESTEROLEMIA: Primary | ICD-10-CM

## 2020-12-27 DIAGNOSIS — E03.9 HYPOTHYROIDISM, UNSPECIFIED TYPE: ICD-10-CM

## 2021-01-14 ENCOUNTER — LAB ONLY (OUTPATIENT)
Dept: FAMILY MEDICINE CLINIC | Age: 71
End: 2021-01-14

## 2021-01-14 DIAGNOSIS — E78.00 HYPERCHOLESTEROLEMIA: ICD-10-CM

## 2021-01-14 DIAGNOSIS — E03.9 HYPOTHYROIDISM, UNSPECIFIED TYPE: ICD-10-CM

## 2021-01-15 LAB
ALBUMIN SERPL-MCNC: 4.1 G/DL (ref 3.5–5)
ALBUMIN/GLOB SERPL: 1.3 {RATIO} (ref 1.1–2.2)
ALP SERPL-CCNC: 79 U/L (ref 45–117)
ALT SERPL-CCNC: 26 U/L (ref 12–78)
ANION GAP SERPL CALC-SCNC: 4 MMOL/L (ref 5–15)
AST SERPL-CCNC: 20 U/L (ref 15–37)
BILIRUB SERPL-MCNC: 0.4 MG/DL (ref 0.2–1)
BUN SERPL-MCNC: 14 MG/DL (ref 6–20)
BUN/CREAT SERPL: 18 (ref 12–20)
CALCIUM SERPL-MCNC: 9.2 MG/DL (ref 8.5–10.1)
CHLORIDE SERPL-SCNC: 105 MMOL/L (ref 97–108)
CHOLEST SERPL-MCNC: 179 MG/DL
CO2 SERPL-SCNC: 30 MMOL/L (ref 21–32)
CREAT SERPL-MCNC: 0.79 MG/DL (ref 0.55–1.02)
ERYTHROCYTE [DISTWIDTH] IN BLOOD BY AUTOMATED COUNT: 12 % (ref 11.5–14.5)
GLOBULIN SER CALC-MCNC: 3.2 G/DL (ref 2–4)
GLUCOSE SERPL-MCNC: 93 MG/DL (ref 65–100)
HCT VFR BLD AUTO: 39.7 % (ref 35–47)
HDLC SERPL-MCNC: 54 MG/DL
HDLC SERPL: 3.3 {RATIO} (ref 0–5)
HGB BLD-MCNC: 13.1 G/DL (ref 11.5–16)
LDLC SERPL CALC-MCNC: 98.4 MG/DL (ref 0–100)
LIPID PROFILE,FLP: NORMAL
MCH RBC QN AUTO: 31.7 PG (ref 26–34)
MCHC RBC AUTO-ENTMCNC: 33 G/DL (ref 30–36.5)
MCV RBC AUTO: 96.1 FL (ref 80–99)
NRBC # BLD: 0 K/UL (ref 0–0.01)
NRBC BLD-RTO: 0 PER 100 WBC
PLATELET # BLD AUTO: 322 K/UL (ref 150–400)
PMV BLD AUTO: 10.8 FL (ref 8.9–12.9)
POTASSIUM SERPL-SCNC: 4.2 MMOL/L (ref 3.5–5.1)
PROT SERPL-MCNC: 7.3 G/DL (ref 6.4–8.2)
RBC # BLD AUTO: 4.13 M/UL (ref 3.8–5.2)
SODIUM SERPL-SCNC: 139 MMOL/L (ref 136–145)
T4 FREE SERPL-MCNC: 1 NG/DL (ref 0.8–1.5)
TRIGL SERPL-MCNC: 133 MG/DL (ref ?–150)
TSH SERPL DL<=0.05 MIU/L-ACNC: 1.99 UIU/ML (ref 0.36–3.74)
VLDLC SERPL CALC-MCNC: 26.6 MG/DL
WBC # BLD AUTO: 7.1 K/UL (ref 3.6–11)

## 2021-01-17 ENCOUNTER — TELEPHONE (OUTPATIENT)
Dept: FAMILY MEDICINE CLINIC | Age: 71
End: 2021-01-17

## 2021-01-17 NOTE — TELEPHONE ENCOUNTER
Patient had blood work done on 1-14-21. I last saw her 2-2020. System shows she is now not scheduled to see me until 1- (see below). Did someone schedule this wrong???   Is it really supposed to be booked w/ me on 1-? It is also supposed to be for a CPE slot and instead it was booked in a routine slot. Please advise Kerline Red. She will need to also get them to get patient rescheduled to a 30 min CPE slot sometime in the next few weeks.              Future Appointments   Date Time Provider Kwadwo Duttno   1/25/2022  8:40 AM Pili Dupont MD [facility]

## 2021-01-24 ENCOUNTER — TELEPHONE (OUTPATIENT)
Dept: FAMILY MEDICINE CLINIC | Age: 71
End: 2021-01-24

## 2021-01-24 NOTE — TELEPHONE ENCOUNTER
Patient had fasting labs done 1-14-21. System shows she is scheduled to see me on 1- but I have not seen her since 2-2020. Please call patient asap to see if this was an error as I need to see her for her annual checkup now not a whole year from now. Nurse please call and get patient scheduled as soon as possible.

## 2021-01-25 NOTE — PROGRESS NOTES
Labs pre-ordered for review at upcoming appointment with PCP:  Future Appointments  Date Time Provider Kwadwo Dutton  1/26/2021  1:30 PM Leena Dupont MD [facility]

## 2021-01-26 ENCOUNTER — OFFICE VISIT (OUTPATIENT)
Dept: FAMILY MEDICINE CLINIC | Age: 71
End: 2021-01-26
Payer: MEDICARE

## 2021-01-26 VITALS
BODY MASS INDEX: 24.77 KG/M2 | SYSTOLIC BLOOD PRESSURE: 120 MMHG | OXYGEN SATURATION: 97 % | DIASTOLIC BLOOD PRESSURE: 72 MMHG | HEART RATE: 100 BPM | RESPIRATION RATE: 18 BRPM | TEMPERATURE: 98.9 F | WEIGHT: 139.8 LBS | HEIGHT: 63 IN

## 2021-01-26 DIAGNOSIS — Z12.12 SCREENING FOR COLORECTAL CANCER: ICD-10-CM

## 2021-01-26 DIAGNOSIS — Z12.11 SCREENING FOR COLORECTAL CANCER: ICD-10-CM

## 2021-01-26 DIAGNOSIS — E03.9 HYPOTHYROIDISM, UNSPECIFIED TYPE: ICD-10-CM

## 2021-01-26 DIAGNOSIS — N32.81 OVERACTIVE BLADDER: ICD-10-CM

## 2021-01-26 DIAGNOSIS — E78.00 HYPERCHOLESTEROLEMIA: Primary | ICD-10-CM

## 2021-01-26 PROCEDURE — 99214 OFFICE O/P EST MOD 30 MIN: CPT | Performed by: FAMILY MEDICINE

## 2021-01-26 PROCEDURE — G8427 DOCREV CUR MEDS BY ELIG CLIN: HCPCS | Performed by: FAMILY MEDICINE

## 2021-01-26 PROCEDURE — G8510 SCR DEP NEG, NO PLAN REQD: HCPCS | Performed by: FAMILY MEDICINE

## 2021-01-26 PROCEDURE — G8399 PT W/DXA RESULTS DOCUMENT: HCPCS | Performed by: FAMILY MEDICINE

## 2021-01-26 PROCEDURE — 3017F COLORECTAL CA SCREEN DOC REV: CPT | Performed by: FAMILY MEDICINE

## 2021-01-26 PROCEDURE — G0463 HOSPITAL OUTPT CLINIC VISIT: HCPCS | Performed by: FAMILY MEDICINE

## 2021-01-26 PROCEDURE — 1090F PRES/ABSN URINE INCON ASSESS: CPT | Performed by: FAMILY MEDICINE

## 2021-01-26 PROCEDURE — G9899 SCRN MAM PERF RSLTS DOC: HCPCS | Performed by: FAMILY MEDICINE

## 2021-01-26 PROCEDURE — 1101F PT FALLS ASSESS-DOCD LE1/YR: CPT | Performed by: FAMILY MEDICINE

## 2021-01-26 PROCEDURE — G8420 CALC BMI NORM PARAMETERS: HCPCS | Performed by: FAMILY MEDICINE

## 2021-01-26 PROCEDURE — G8536 NO DOC ELDER MAL SCRN: HCPCS | Performed by: FAMILY MEDICINE

## 2021-01-26 RX ORDER — ATORVASTATIN CALCIUM 10 MG/1
TABLET, FILM COATED ORAL
Qty: 90 TAB | Refills: 3 | Status: SHIPPED | OUTPATIENT
Start: 2021-01-26 | End: 2022-01-23 | Stop reason: SDUPTHER

## 2021-01-26 RX ORDER — TOLTERODINE 4 MG/1
CAPSULE, EXTENDED RELEASE ORAL
Qty: 90 CAP | Refills: 3 | Status: SHIPPED | OUTPATIENT
Start: 2021-01-26 | End: 2022-01-23 | Stop reason: SDUPTHER

## 2021-01-26 RX ORDER — LEVOTHYROXINE SODIUM 50 UG/1
TABLET ORAL
Qty: 90 TAB | Refills: 3 | Status: SHIPPED | OUTPATIENT
Start: 2021-01-26 | End: 2022-01-23 | Stop reason: SDUPTHER

## 2021-01-26 NOTE — PROGRESS NOTES
Chief Complaint   Patient presents with    Medication Refill     1. Have you been to the ER, urgent care clinic since your last visit? Hospitalized since your last visit? No    2. Have you seen or consulted any other health care providers outside of the [address] [place] [address] since your last visit? Include any pap smears or colon screening.  No

## 2021-01-26 NOTE — PROGRESS NOTES
Chief Complaint   Patient presents with    Cholesterol Problem     fasting labs done 1-14-21    Thyroid Problem    Medication Refill       HISTORY OF PRESENT ILLNESS   HPI  Follow up hypercholesterolemia, hypothyroidism, OAB, and medication check w/ rx renewals. Had fasting labs done 1-14-21, here to review  Overall getting along well and feeling well in general  No complaints at this time  Doing well on current medication regimen and tolerating w/o reaction or side effects  Diet: Nothing special  Exercise: Walks 3 miles a day  Caffeine: 1-2 cups of coffee qam  Weight: Stable in the 130's x years     REVIEW OF SYMPTOMS   Review of Systems   Constitutional: Negative. HENT: Negative. Eyes: Negative. Respiratory: Negative. Cardiovascular: Negative. Gastrointestinal: Negative. Negative for abdominal pain, blood in stool, constipation, nausea and vomiting. Genitourinary: Negative. States the Detrol is a \"must have\" has been life changing, works great   Musculoskeletal: Negative for myalgias. Neurological: Negative. Endo/Heme/Allergies: Negative. Psychiatric/Behavioral: Negative. PROBLEM LIST/MEDICAL HISTORY     Problem List  Date Reviewed: 1/26/2021          Codes Class Noted    ACP (advance care planning) ICD-10-CM: Z71.89  ICD-9-CM: V65.49  1/16/2019    Overview Signed 1/16/2019  9:56 AM by Celina Orr MD     Discussion [phone]. Patient has an AMD. Asked to provide copy for file.               Hypercholesterolemia ICD-10-CM: E78.00  ICD-9-CM: 272.0  5/13/2011        H/O Transient Elevated liver enzymes ~2007, w/u Negative ICD-10-CM: R74.8  ICD-9-CM: 790.5  5/13/2011    Overview Signed 5/13/2011  1:06 PM by Celina Orr MD     2007, Hepatitis panel, GI w/u  and U/s negative             Overactive bladder ICD-10-CM: N32.81  ICD-9-CM: 596.51  5/13/2011        Hypothyroidism ICD-10-CM: E03.9  ICD-9-CM: 244.9  5/19/2010    Overview Signed 5/19/2010  3:46 PM by Ajay carrington [address]             Family history of MI (myocardial infarction) ICD-10-CM: Z82.49  ICD-9-CM: V17.3  5/19/2010    Overview Signed 5/19/2010  3:46 PM by Delia Will     Paternal at age 51 yo             Osteopenia ICD-10-CM: M85.80  ICD-9-CM: 733.90  5/19/2010    Overview Addendum 1/15/2018 10:52 AM by Dmitriy Lagos MD     Dexa per Gyn Dr Julia Palomo 12/2009, q 3 yrs             AR (allergic rhinitis) ICD-10-CM: J30.9  ICD-9-CM: 477.9  7/31/2002    Overview Addendum 8/28/2014  8:36 AM by Dmitriy Lagos MD     ~4153; Fall worse season                       PAST SURGICAL HISTORY     Past Surgical History:   Procedure Laterality Date    COLONOSCOPY  2/2003    normal, but f/u 5 yr; Dr Josie Noble  2011    ok x 10 yrs, Dr Kaur Fail EKG ORDERABLE  1/18/2010    Normal, NSR, rate 76    HM DEXA SCAN  12/2009    Osteopenia, per Gyn    [address] [place] [address] [address]    for infertility eval    HX SALPINGO-OOPHORECTOMY  1970's    for ectopic pregnancy    COOPER MAMMOGRAM SCREEN BILAT  VPI q yr    annually per Dr Alejandro Borrego [facility]BD COMP  9/2007, [facility]    normal         MEDICATIONS     Current Outpatient Medications   Medication Sig    vit A/vit C/vit E/zinc/copper (OCUVITE PRESERVISION PO) Take  by mouth daily.  tolterodine ER (DETROL LA) 4 mg ER capsule TAKE 1 CAPSULE DAILY FOR BLADDER HYPERACTIVITY    levothyroxine (SYNTHROID) 50 mcg tablet TAKE 1 TABLET DAILY BEFORE BREAKFAST FOR HYPOTHYROIDISM    atorvastatin (LIPITOR) 10 mg tablet TAKE 1 TABLET DAILY FOR HYPERCHOLESTEROLEMIA    multivitamin (ONE A DAY) tablet Take 1 Tab by mouth daily.  aspirin 81 mg tablet Take 81 mg by mouth daily.  CALCIUM CARBONATE/VITAMIN D3 (CALCIUM 600 + D PO) Take  by mouth daily.  fish oil-dha-epa (FISH OIL) 1,200-144-216 mg Cap Take  by mouth daily.  mometasone (NASONEX) 50 mcg/actuation nasal spray 2 Sprays by Both Nostrils route daily.  (Patient taking differently: 2 Sprays by Both Nostrils route daily as needed.)     No current facility-administered medications for this visit.            ALLERGIES     Allergies   Allergen Reactions    Bactrim [Sulfamethoprim Ds] Hives    Vytorin 10-10 [Ezetimibe-Simvastatin] Other (comments)     Elevated LFT's    Other Medication Nausea Only     Lipitor 20mg dose- caused GI upset -          SOCIAL HISTORY     Social History     Tobacco Use    Smoking status: Never Smoker    Smokeless tobacco: Never Used   Substance Use Topics    Alcohol use: Yes     Comment: occasional wine     Social History     Social History Narrative    2 adopted children; her daughter, son in law and new grandbaby live w her now    Diet: Nothing special    Exercise: Walks 3 miles a day    Caffeine: 1-2 cups of coffee qam    Weight: Stable in the 130's x years         Social History     Substance and Sexual Activity   Sexual Activity Yes    Partners: Male       IMMUNIZATIONS     Immunization History   Administered Date(s) Administered    Influenza High Dose Vaccine PF 10/10/2019    Influenza Vaccine 10/06/2014, 10/22/2016, 10/17/2017, 10/16/2018, 10/05/2020    Influenza Vaccine (Tri) Adjuvanted (>65 Yrs FLUAD TRI 00585) 10/11/2019    Pneumococcal Conjugate (PCV-13) 01/15/2018    Pneumococcal Polysaccharide (PPSV-23) 2019    TD Vaccine 2010    Tdap 2016    Zoster Recombinant 10/10/2018, 01/15/2019    Zoster Vaccine, Live 2014         FAMILY HISTORY     Family History   Problem Relation Age of Onset    Breast Cancer Mother         dx in her mid 66's, survivor    Hypertension Mother     Stroke Mother     Thyroid Disease Mother     Heart Disease Mother          of pneumonia age 80    Heart Disease Father     Heart Attack Father 52    Breast Cancer Sister 48        survivor    High Cholesterol Sister     High Cholesterol Brother     High Cholesterol Brother          VITALS     Visit Vitals  /72 (BP 1 Location: Left arm, BP Patient Position: Sitting)   Pulse 100   Temp 98.9 °F (37.2 °C) (Temporal)   Resp 18   Ht 5' 3\" (1.6 m)   Wt 139 lb 12.8 oz (63.4 kg)   SpO2 97%   BMI 24.76 kg/m²          PHYSICAL EXAMINATION   Physical Exam  Vitals signs reviewed. Constitutional:       Appearance: Normal appearance. HENT:      Right Ear: Tympanic membrane normal.      Left Ear: Tympanic membrane normal.   Eyes:      General: No scleral icterus. Conjunctiva/sclera: Conjunctivae normal.   Neck:      Musculoskeletal: Neck supple. No muscular tenderness. Vascular: No carotid bruit. Cardiovascular:      Rate and Rhythm: Normal rate and regular rhythm. Heart sounds: Normal heart sounds. No murmur. Pulmonary:      Effort: Pulmonary effort is normal.      Breath sounds: Normal breath sounds. Abdominal:      General: Abdomen is flat. Bowel sounds are normal.      Palpations: Abdomen is soft. Tenderness: There is no abdominal tenderness. Musculoskeletal:         General: No tenderness. Right lower leg: No edema. Left lower leg: No edema. Lymphadenopathy:      Cervical: No cervical adenopathy. Skin:     General: Skin is warm and dry. Neurological:      General: No focal deficit present. Mental Status: She is alert.    Psychiatric:         Mood and Affect: Mood normal.                LABORATORY DATA/ANCILLARY/IMAGING     Results for orders placed or performed in visit on 01/14/21   LIPID PANEL   Result Value Ref Range    LIPID PROFILE          Cholesterol, total 179 <200 MG/DL    Triglyceride 133 <150 MG/DL    HDL Cholesterol 54 MG/DL    LDL, calculated 98.4 0 - 100 MG/DL    VLDL, calculated 26.6 MG/DL    CHOL/HDL Ratio 3.3 0.0 - 5.0     TSH 3RD GENERATION   Result Value Ref Range    TSH 1.99 0.36 - 3.74 uIU/mL   T4, FREE   Result Value Ref Range    T4, Free 1.0 0.8 - 1.5 NG/DL   CBC W/O DIFF   Result Value Ref Range    WBC 7.1 3.6 - 11.0 K/uL    RBC 4.13 3.80 - 5.20 M/uL    HGB 13.1 11.5 - 16.0 g/dL    HCT 39.7 35.0 - 47.0 %    MCV 96.1 80.0 - 99.0 FL    MCH 31.7 26.0 - 34.0 PG    MCHC 33.0 30.0 - 36.5 g/dL    RDW 12.0 11.5 - 14.5 %    PLATELET 001 272 - 054 K/uL    MPV 10.8 8.9 - 12.9 FL    NRBC 0.0 0  WBC    ABSOLUTE NRBC 0.00 0.00 - 1.94 K/uL   METABOLIC PANEL, COMPREHENSIVE   Result Value Ref Range    Sodium 139 136 - 145 mmol/L    Potassium 4.2 3.5 - 5.1 mmol/L    Chloride 105 97 - 108 mmol/L    CO2 30 21 - 32 mmol/L    Anion gap 4 (L) 5 - 15 mmol/L    Glucose 93 65 - 100 mg/dL    BUN 14 6 - 20 MG/DL    Creatinine 0.79 0.55 - 1.02 MG/DL    BUN/Creatinine ratio 18 12 - 20      GFR est AA >60 >60 ml/min/1.73m2    GFR est non-AA >60 >60 ml/min/1.73m2    Calcium 9.2 8.5 - 10.1 MG/DL    Bilirubin, total 0.4 0.2 - 1.0 MG/DL    ALT (SGPT) 26 12 - 78 U/L    AST (SGOT) 20 15 - 37 U/L    Alk. phosphatase 79 45 - 117 U/L    Protein, total 7.3 6.4 - 8.2 g/dL    Albumin 4.1 3.5 - 5.0 g/dL    Globulin 3.2 2.0 - 4.0 g/dL    A-G Ratio 1.3 1.1 - 2.2              ASSESSMENT & PLAN       ICD-10-CM ICD-9-CM    1. Hypercholesterolemia  E78.00 272.0 atorvastatin (LIPITOR) 10 mg tablet   2. Hypothyroidism, unspecified type  E03.9 244.9 levothyroxine (SYNTHROID) 50 mcg tablet   3. Overactive bladder  N32.81 596.51 tolterodine ER (DETROL LA) 4 mg ER capsule   4. Screening for colorectal cancer  Z12.11 V76.51 REFERRAL TO GASTROENTEROLOGY    Z12.12 V76.41      Fasting lab results from 1-14-21, cardiovascular risk, and specific lipid/LDL goals reviewed  Reviewed diet, nutrition, exercise, weight management, BMI/goals. Age/risk based screening recommendations, health maintenance & prevention counseling. Cancer screening USPTFS guidelines reviewed w/ pt today. Discussed benefits/positive/negative outcomes of screening based on age/risk stratification. Informed consent for/against screening based on pt's personal hx/risk factors. Updated in history above and health maintenance. Reviewed medications and side effects. Doing well on current regimen. No changes at this time.  Renewed rx's for mail order x 1 yr as noted today  RTC 1 yr for fasting follow up and annual wellness visit

## 2022-01-19 ENCOUNTER — OFFICE VISIT (OUTPATIENT)
Dept: FAMILY MEDICINE CLINIC | Age: 72
End: 2022-01-19
Payer: MEDICARE

## 2022-01-19 VITALS
BODY MASS INDEX: 24.56 KG/M2 | HEART RATE: 90 BPM | TEMPERATURE: 98.3 F | OXYGEN SATURATION: 98 % | WEIGHT: 138.6 LBS | RESPIRATION RATE: 16 BRPM | DIASTOLIC BLOOD PRESSURE: 74 MMHG | HEIGHT: 63 IN | SYSTOLIC BLOOD PRESSURE: 122 MMHG

## 2022-01-19 DIAGNOSIS — Z00.00 MEDICARE ANNUAL WELLNESS VISIT, SUBSEQUENT: Primary | ICD-10-CM

## 2022-01-19 DIAGNOSIS — E03.9 HYPOTHYROIDISM, UNSPECIFIED TYPE: ICD-10-CM

## 2022-01-19 DIAGNOSIS — E78.00 HYPERCHOLESTEROLEMIA: ICD-10-CM

## 2022-01-19 LAB
ALBUMIN SERPL-MCNC: 4 G/DL (ref 3.5–5)
ALBUMIN/GLOB SERPL: 1.3 {RATIO} (ref 1.1–2.2)
ALP SERPL-CCNC: 72 U/L (ref 45–117)
ALT SERPL-CCNC: 23 U/L (ref 12–78)
ANION GAP SERPL CALC-SCNC: 3 MMOL/L (ref 5–15)
AST SERPL-CCNC: 20 U/L (ref 15–37)
BILIRUB SERPL-MCNC: 0.4 MG/DL (ref 0.2–1)
BUN SERPL-MCNC: 10 MG/DL (ref 6–20)
BUN/CREAT SERPL: 13 (ref 12–20)
CALCIUM SERPL-MCNC: 9.5 MG/DL (ref 8.5–10.1)
CHLORIDE SERPL-SCNC: 105 MMOL/L (ref 97–108)
CHOLEST SERPL-MCNC: 190 MG/DL
CO2 SERPL-SCNC: 31 MMOL/L (ref 21–32)
CREAT SERPL-MCNC: 0.8 MG/DL (ref 0.55–1.02)
ERYTHROCYTE [DISTWIDTH] IN BLOOD BY AUTOMATED COUNT: 12.4 % (ref 11.5–14.5)
GLOBULIN SER CALC-MCNC: 3.1 G/DL (ref 2–4)
GLUCOSE SERPL-MCNC: 100 MG/DL (ref 65–100)
HCT VFR BLD AUTO: 40.3 % (ref 35–47)
HDLC SERPL-MCNC: 50 MG/DL
HDLC SERPL: 3.8 {RATIO} (ref 0–5)
HGB BLD-MCNC: 13.1 G/DL (ref 11.5–16)
LDLC SERPL CALC-MCNC: 94.8 MG/DL (ref 0–100)
MCH RBC QN AUTO: 31 PG (ref 26–34)
MCHC RBC AUTO-ENTMCNC: 32.5 G/DL (ref 30–36.5)
MCV RBC AUTO: 95.5 FL (ref 80–99)
NRBC # BLD: 0 K/UL (ref 0–0.01)
NRBC BLD-RTO: 0 PER 100 WBC
PLATELET # BLD AUTO: 353 K/UL (ref 150–400)
PMV BLD AUTO: 10.8 FL (ref 8.9–12.9)
POTASSIUM SERPL-SCNC: 4.5 MMOL/L (ref 3.5–5.1)
PROT SERPL-MCNC: 7.1 G/DL (ref 6.4–8.2)
RBC # BLD AUTO: 4.22 M/UL (ref 3.8–5.2)
SODIUM SERPL-SCNC: 139 MMOL/L (ref 136–145)
T4 FREE SERPL-MCNC: 1 NG/DL (ref 0.8–1.5)
TRIGL SERPL-MCNC: 226 MG/DL (ref ?–150)
TSH SERPL DL<=0.05 MIU/L-ACNC: 1.72 UIU/ML (ref 0.36–3.74)
VLDLC SERPL CALC-MCNC: 45.2 MG/DL
WBC # BLD AUTO: 7.1 K/UL (ref 3.6–11)

## 2022-01-19 PROCEDURE — G8399 PT W/DXA RESULTS DOCUMENT: HCPCS | Performed by: FAMILY MEDICINE

## 2022-01-19 PROCEDURE — G9899 SCRN MAM PERF RSLTS DOC: HCPCS | Performed by: FAMILY MEDICINE

## 2022-01-19 PROCEDURE — G8427 DOCREV CUR MEDS BY ELIG CLIN: HCPCS | Performed by: FAMILY MEDICINE

## 2022-01-19 PROCEDURE — G8536 NO DOC ELDER MAL SCRN: HCPCS | Performed by: FAMILY MEDICINE

## 2022-01-19 PROCEDURE — 1101F PT FALLS ASSESS-DOCD LE1/YR: CPT | Performed by: FAMILY MEDICINE

## 2022-01-19 PROCEDURE — G0439 PPPS, SUBSEQ VISIT: HCPCS | Performed by: FAMILY MEDICINE

## 2022-01-19 PROCEDURE — G8420 CALC BMI NORM PARAMETERS: HCPCS | Performed by: FAMILY MEDICINE

## 2022-01-19 PROCEDURE — 99214 OFFICE O/P EST MOD 30 MIN: CPT | Performed by: FAMILY MEDICINE

## 2022-01-19 PROCEDURE — G0463 HOSPITAL OUTPT CLINIC VISIT: HCPCS | Performed by: FAMILY MEDICINE

## 2022-01-19 PROCEDURE — G8510 SCR DEP NEG, NO PLAN REQD: HCPCS | Performed by: FAMILY MEDICINE

## 2022-01-19 PROCEDURE — 1090F PRES/ABSN URINE INCON ASSESS: CPT | Performed by: FAMILY MEDICINE

## 2022-01-19 PROCEDURE — 3017F COLORECTAL CA SCREEN DOC REV: CPT | Performed by: FAMILY MEDICINE

## 2022-01-19 NOTE — PROGRESS NOTES
Chief Complaint   Patient presents with    Annual Wellness Visit    Cholesterol Problem     fasting    Thyroid Problem     1. \"Have you been to the ER, urgent care clinic since your last visit? Hospitalized since your last visit? \" No    2. \"Have you seen or consulted any other health care providers outside of the [address] [place] [address] since your last visit? \" No     3. For patients over 45: Has the patient had a colonoscopy? In July    If the patient is female:    4. For patients over 40: Has the patient had a mammogram? lo Medeiros last Jan and she will be scheduling that    5. For patients over 21: Has the patient had a pap smear?  NA

## 2022-01-19 NOTE — PATIENT INSTRUCTIONS

## 2022-01-19 NOTE — PROGRESS NOTES
Chief Complaint   Patient presents with    Annual Wellness Visit    Cholesterol Problem     Fasting    Thyroid Problem       HISTORY OF PRESENT ILLNESS   HPI  Fasting follow up hypercholesterolemia, hypothyroidism. Doing well on current regimen of Lipitor 10 mg and Synthroid 50 mcg daily  Clinically getting along well and feeling well in general  Diet: Nothing special  Exercise: Walks 2 miles a day/40 minutes  Caffeine: 1-2 cups of coffee qam  Weight: Stable in the 130's x years   REVIEW OF SYMPTOMS   Review of Systems   Constitutional: Negative. Respiratory: Negative. Cardiovascular: Negative. Gastrointestinal: Negative. Genitourinary: Negative. Musculoskeletal: Negative. Neurological: Negative. Endo/Heme/Allergies: Negative. Psychiatric/Behavioral: Negative. PROBLEM LIST/MEDICAL HISTORY     Problem List  Date Reviewed: 1/19/2022          Codes Class Noted    ACP (advance care planning) ICD-10-CM: Z71.89  ICD-9-CM: V65.49  1/16/2019    Overview Signed 1/16/2019  9:56 AM by Sparkle Levy MD     Discussion 0-0712. Patient has an AMD. Asked to provide copy for file.               Hypercholesterolemia ICD-10-CM: E78.00  ICD-9-CM: 272.0  5/13/2011        H/O Transient Elevated liver enzymes ~2007, w/u Negative ICD-10-CM: R74.8  ICD-9-CM: 790.5  5/13/2011    Overview Signed 5/13/2011  1:06 PM by Sparkle Levy MD     2007, Hepatitis panel, GI w/u  and U/s negative             Overactive bladder ICD-10-CM: N32.81  ICD-9-CM: 596.51  5/13/2011        Hypothyroidism ICD-10-CM: E03.9  ICD-9-CM: 244.9  5/19/2010    Overview Signed 5/19/2010  3:46 PM by Ajay Santos [facility], [address]             Family history of MI (myocardial infarction) ICD-10-CM: Z82.49  ICD-9-CM: V17.3  5/19/2010    Overview Signed 5/19/2010  3:46 PM by Jasiel Nunes     Paternal at age 55 yo             Osteopenia ICD-10-CM: M85.80  ICD-9-CM: 733.90  5/19/2010    Overview Addendum 1/15/2018 10:52 AM by Lucien Juarez MD     Dexa per Gyn Dr Abel Reeves 12/2009, q 3 yrs             AR (allergic rhinitis) ICD-10-CM: J30.9  ICD-9-CM: 477.9  7/31/2002    Overview Addendum 8/28/2014  8:36 AM by Lucien Juarez MD     ~2905; Fall worse season                       PAST SURGICAL HISTORY     Past Surgical History:   Procedure Laterality Date    EKG ORDERABLE  1/18/2010    Normal, NSR, rate 76    HM DEXA SCAN  12/2009    Osteopenia, per Gyn    HX COLONOSCOPY  02/2003    Normal, f/u 5 yr; Dr Rodriguez Client x 10 yrs, Dr Stella Hartmann HX COLONOSCOPY  07/2021    Large polyp, repeat 3 yrs, [facility]     [address] [place] [address]    for infertility eval    HX SALPINGO-OOPHORECTOMY  1970's    for ectopic pregnancy    US ABD COMP  9/2007, [facility]    normal         MEDICATIONS     Current Outpatient Medications   Medication Sig    TURMERIC PO Take 1 Capsule by mouth daily.  vit A/vit C/vit E/zinc/copper (OCUVITE PRESERVISION PO) Take  by mouth daily.  atorvastatin (LIPITOR) 10 mg tablet TAKE 1 TABLET DAILY FOR HYPERCHOLESTEROLEMIA    levothyroxine (SYNTHROID) 50 mcg tablet TAKE 1 TABLET DAILY BEFORE BREAKFAST FOR HYPOTHYROIDISM    tolterodine ER (DETROL LA) 4 mg ER capsule TAKE 1 CAPSULE DAILY FOR BLADDER HYPERACTIVITY    multivitamin (ONE A DAY) tablet Take 1 Tab by mouth daily.  mometasone (NASONEX) 50 mcg/actuation nasal spray 2 Sprays by Both Nostrils route daily. (Patient taking differently: 2 Sprays by Both Nostrils route daily as needed.)    aspirin 81 mg tablet Take 81 mg by mouth daily.  CALCIUM CARBONATE/VITAMIN D3 (CALCIUM 600 + D PO) Take  by mouth daily.  fish oil-dha-epa (FISH OIL) 1,200-144-216 mg Cap Take  by mouth daily. No current facility-administered medications for this visit.           ALLERGIES     Allergies   Allergen Reactions    Bactrim [Sulfamethoprim Ds] Hives    Vytorin 10-10 [Ezetimibe-Simvastatin] Other (comments)     Elevated LFT's    Other Medication Nausea Only     Lipitor 20mg dose- caused GI upset           SOCIAL HISTORY     Social History     Tobacco Use    Smoking status: Never Smoker    Smokeless tobacco: Never Used   Substance Use Topics    Alcohol use: Yes     Comment: occasional wine     Social History     Social History Narrative        2 adopted children    Her daughter, son in law and new grandbaby live w her now    Occupation: HR    Retired     Diet: Nothing special    Exercise: Walks 2 miles a day/40 minutes    Caffeine: 1-2 cups of coffee qam    Weight: Stable in the 130's x years         Social History     Substance and Sexual Activity   Sexual Activity Yes    Partners: Male       IMMUNIZATIONS     Immunization History   Administered Date(s) Administered    COVID-19, Pfizer Purple top, DILUTE for use, 12+ yrs, 30mcg/0.3mL dose 2021, 2021, 2021    Influenza High Dose Vaccine PF 10/10/2019, 10/12/2021    Influenza Vaccine 10/06/2014, 10/22/2016, 10/17/2017, 10/16/2018, 10/05/2020    Influenza Vaccine (Tri) Adjuvanted (>65 Yrs FLUAD TRI 42354) 10/11/2019    Pneumococcal Conjugate (PCV-13) 01/15/2018    Pneumococcal Polysaccharide (PPSV-23) 2013, 2019    TD Vaccine 2010    Tdap 2016    Zoster Recombinant 10/10/2018, 01/15/2019    Zoster Vaccine, Live 2014         FAMILY HISTORY     Family History   Problem Relation Age of Onset    Breast Cancer Mother         dx in her mid 66's, survivor    Hypertension Mother     Stroke Mother     Thyroid Disease Mother     Heart Disease Mother          of pneumonia age 80    Heart Disease Father     Heart Attack Father 52    High Cholesterol Brother     High Cholesterol Brother     Mental illness Brother         Mental Handicap,  in a group home    Breast Cancer Sister 48        survivor    High Cholesterol Sister          VITALS     Visit Vitals  /74 (BP 1 Location: Left upper arm, BP Patient Position: Sitting, BP Cuff Size: Adult)   Pulse 90   Temp 98.3 °F (36.8 °C) (Oral)   Resp 16   Ht 5' 3\" (1.6 m)   Wt 138 lb 9.6 oz (62.9 kg)   SpO2 98%   BMI 24.55 kg/m²          PHYSICAL EXAMINATION   Physical Exam  Vitals reviewed. Constitutional:       Appearance: Normal appearance. HENT:      Right Ear: Tympanic membrane normal.      Left Ear: Tympanic membrane normal.   Eyes:      Conjunctiva/sclera: Conjunctivae normal.   Neck:      Vascular: No carotid bruit. Cardiovascular:      Rate and Rhythm: Normal rate and regular rhythm. Heart sounds: Normal heart sounds. Pulmonary:      Effort: Pulmonary effort is normal.      Breath sounds: Normal breath sounds. Abdominal:      General: There is no distension. Palpations: Abdomen is soft. There is no mass. Tenderness: There is no abdominal tenderness. Musculoskeletal:         General: No swelling or tenderness. Normal range of motion. Cervical back: Neck supple. Right lower leg: No edema. Left lower leg: No edema. Skin:     General: Skin is warm and dry. Neurological:      General: No focal deficit present. Mental Status: She is alert and oriented to person, place, and time. Mental status is at baseline. Psychiatric:         Mood and Affect: Mood and affect normal.         Cognition and Memory: Cognition and memory normal.             ASSESSMENT & PLAN   Diagnoses and all orders for this visit:    1. Medicare annual wellness visit, subsequent  See separate note under this same encounter visit for Medicare Wellness note. 2. Hypercholesterolemia maintained on statin therapy  -     METABOLIC PANEL, COMPREHENSIVE; Future  -     LIPID PANEL; Future    3. Hypothyroidism, clinically stable on current TRT  -     CBC W/O DIFF; Future  -     METABOLIC PANEL, COMPREHENSIVE; Future  -     TSH 3RD GENERATION; Future  -     T4, FREE;  Future    Fasting labs checked today  Cardiovascular risk and specific lipid/LDL goals assessed  Reviewed diet, nutrition, exercise, weight management, BMI/goals. Age/risk based screening recommendations, health maintenance & prevention counseling. Cancer screening USPTFS guidelines reviewed w/ pt today. Discussed benefits/positive/negative outcomes of screening based on age/risk stratification. Informed consent for/against screening based on pt's personal hx/risk factors. Updated in history above and health maintenance. Mammogram negative at [address] 1-2021. Annual screening due at this time. Patient scheduling. Colonoscopy screen done 7-2021 at [facility], report requested  Further follow up & other recommendations pending review of labs.  If all remains good and stable, follow up in 1 yr  Patient will need Lipitor and Synthroid Rx's renewed x 1 year upon review of labs

## 2022-01-19 NOTE — PROGRESS NOTES
This is the Subsequent Medicare Annual Wellness Exam, performed 12 months or more after the Initial AWV or the last Subsequent AWV    I have reviewed the patient's medical history in detail and updated the computerized patient record. Assessment/Plan   Education and counseling provided:  Are appropriate based on today's review and evaluation    1. Medicare annual wellness visit, subsequent       Depression Risk Factor Screening     3 most recent PHQ Screens 1/19/2022   PHQ Not Done -   Little interest or pleasure in doing things Not at all   Feeling down, depressed, irritable, or hopeless Not at all   Total Score PHQ 2 0       Alcohol & Drug Abuse Risk Screen    Do you average more than 1 drink per night or more than 7 drinks a week:  No    On any one occasion in the past three months have you have had more than 3 drinks containing alcohol:  No          Functional Ability and Level of Safety    Hearing: Hearing is good. Activities of Daily Living: The home contains: no safety equipment. Patient does total self care  ADL Assessment 1/19/2022   Feeding yourself No Help Needed   Getting from bed to chair No Help Needed   Getting dressed No Help Needed   Bathing or showering No Help Needed   Walk across the room (includes cane/walker) No Help Needed   Using the telphone No Help Needed   Taking your medications No Help Needed   Preparing meals No Help Needed   Managing money (expenses/bills) No Help Needed   Moderately strenuous housework (laundry) No Help Needed   Shopping for personal items (toiletries/medicines) No Help Needed   Shopping for groceries No Help Needed   Driving No Help Needed   Climbing a flight of stairs No Help Needed   Getting to places beyond walking distances No Help Needed         Ambulation: with no difficulty     Fall Risk:  Fall Risk Assessment, last 12 mths 1/19/2022   Able to walk? Yes   Fall in past 12 months? 0   Do you feel unsteady?  0   Are you worried about falling 0 Abuse Screen:  Patient is not abused       Cognitive Screening    Has your family/caregiver stated any concerns about your memory: no         Health Maintenance Due     Health Maintenance Due   Topic Date Due    Breast Cancer Screen Mammogram  01/08/2022    Lipid Screen  01/14/2022       Patient Care Team   Patient Care Team:  Dalia Gauthier MD as PCP - Fly Tolbert MD as PCP - [facility] Empaneled Provider  Chantell Kee MD (Obstetrics & Gynecology)  Caesy Morse MD (Obstetrics & Gynecology)    History     Patient Active Problem List   Diagnosis Code    Hypothyroidism E03.9    Family history of MI (myocardial infarction) Z80.55    AR (allergic rhinitis) J30.9    Osteopenia M85.80    Hypercholesterolemia E78.00    H/O Transient Elevated liver enzymes ~2007, w/u Negative R74.8    Overactive bladder N32.81    ACP (advance care planning) Z71.89     Past Medical History:   Diagnosis Date    ACP (advance care planning) 1/16/2019    Discussion 1-2019. Patient has an AMD. Asked to provide copy for file.      AR (allergic rhinitis) 7/31/2002    Family history of MI (myocardial infarction) 5/19/2010    H/O Transient Elevated liver enzymes ~2007, w/u Negative 5/13/2011    Hypercholesterolemia 5/13/2011    Hypothyroidism 5/19/2010    Osteopenia 5/19/2010    Overactive bladder 5/13/2011      Past Surgical History:   Procedure Laterality Date    EKG ORDERABLE  1/18/2010    Normal, NSR, rate 76    HM DEXA SCAN  12/2009    Osteopenia, per Gyn    HX COLONOSCOPY  02/2003    Normal, f/u 5 yr; Dr Jackson Ip x 10 yrs, Dr Mt Cohn HX COLONOSCOPY  07/2021    Large polyp, [facility]     [address] [place] [address]    for infertility eval    HX SALPINGO-OOPHORECTOMY  1970's    for ectopic pregnancy    US ABD COMP  9/2007, [facility]    normal     Current Outpatient Medications   Medication Sig Dispense Refill    TURMERIC PO Take 1 Capsule by mouth daily.  vit A/vit C/vit E/zinc/copper (OCUVITE PRESERVISION PO) Take  by mouth daily.  atorvastatin (LIPITOR) 10 mg tablet TAKE 1 TABLET DAILY FOR HYPERCHOLESTEROLEMIA 90 Tab 3    levothyroxine (SYNTHROID) 50 mcg tablet TAKE 1 TABLET DAILY BEFORE BREAKFAST FOR HYPOTHYROIDISM 90 Tab 3    tolterodine ER (DETROL LA) 4 mg ER capsule TAKE 1 CAPSULE DAILY FOR BLADDER HYPERACTIVITY 90 Cap 3    multivitamin (ONE A DAY) tablet Take 1 Tab by mouth daily.  mometasone (NASONEX) 50 mcg/actuation nasal spray 2 Sprays by Both Nostrils route daily. (Patient taking differently: 2 Sprays by Both Nostrils route daily as needed.) 3 Container 1    aspirin 81 mg tablet Take 81 mg by mouth daily.  CALCIUM CARBONATE/VITAMIN D3 (CALCIUM 600 + D PO) Take  by mouth daily.  fish oil-dha-epa (FISH OIL) 1,200-144-216 mg Cap Take  by mouth daily.        Allergies   Allergen Reactions    Bactrim [Sulfamethoprim Ds] Hives    Vytorin 10-10 [Ezetimibe-Simvastatin] Other (comments)     Elevated LFT's    Other Medication Nausea Only     Lipitor 20mg dose- caused GI upset        Family History   Problem Relation Age of Onset    Breast Cancer Mother         dx in her mid 66's, survivor    Hypertension Mother     Stroke Mother     Thyroid Disease Mother     Heart Disease Mother          of pneumonia age 80    Heart Disease Father     Heart Attack Father 52    High Cholesterol Brother     High Cholesterol Brother     Mental illness Brother         Mental Handicap,  in a group home    Breast Cancer Sister 48        survivor    High Cholesterol Sister      Social History     Tobacco Use    Smoking status: Never Smoker    Smokeless tobacco: Never Used   Substance Use Topics    Alcohol use: Yes     Comment: occasional wine         Casey Schrader MD

## 2022-01-23 DIAGNOSIS — E03.9 HYPOTHYROIDISM, UNSPECIFIED TYPE: ICD-10-CM

## 2022-01-23 DIAGNOSIS — E78.00 HYPERCHOLESTEROLEMIA: ICD-10-CM

## 2022-01-23 DIAGNOSIS — N32.81 OVERACTIVE BLADDER: ICD-10-CM

## 2022-01-23 RX ORDER — TOLTERODINE 4 MG/1
CAPSULE, EXTENDED RELEASE ORAL
Qty: 90 CAPSULE | Refills: 3 | Status: SHIPPED | OUTPATIENT
Start: 2022-01-23

## 2022-01-23 RX ORDER — ATORVASTATIN CALCIUM 10 MG/1
TABLET, FILM COATED ORAL
Qty: 90 TABLET | Refills: 3 | Status: SHIPPED | OUTPATIENT
Start: 2022-01-23

## 2022-01-23 RX ORDER — LEVOTHYROXINE SODIUM 50 UG/1
TABLET ORAL
Qty: 90 TABLET | Refills: 3 | Status: SHIPPED | OUTPATIENT
Start: 2022-01-23

## 2022-03-04 ENCOUNTER — TELEPHONE (OUTPATIENT)
Dept: FAMILY MEDICINE CLINIC | Age: 72
End: 2022-03-04

## 2022-03-20 PROBLEM — Z71.89 ACP (ADVANCE CARE PLANNING): Status: ACTIVE | Noted: 2019-01-16

## 2022-04-13 ENCOUNTER — OFFICE VISIT (OUTPATIENT)
Dept: FAMILY MEDICINE CLINIC | Age: 72
End: 2022-04-13
Payer: MEDICARE

## 2022-04-13 VITALS
RESPIRATION RATE: 16 BRPM | TEMPERATURE: 98.6 F | WEIGHT: 138.2 LBS | HEART RATE: 90 BPM | SYSTOLIC BLOOD PRESSURE: 132 MMHG | BODY MASS INDEX: 24.49 KG/M2 | DIASTOLIC BLOOD PRESSURE: 78 MMHG | OXYGEN SATURATION: 98 % | HEIGHT: 63 IN

## 2022-04-13 DIAGNOSIS — E03.9 HYPOTHYROIDISM, UNSPECIFIED TYPE: ICD-10-CM

## 2022-04-13 DIAGNOSIS — Z01.818 PREOP EXAMINATION: Primary | ICD-10-CM

## 2022-04-13 DIAGNOSIS — M21.612 BUNION OF LEFT FOOT: ICD-10-CM

## 2022-04-13 DIAGNOSIS — E78.00 HYPERCHOLESTEROLEMIA: ICD-10-CM

## 2022-04-13 PROBLEM — L92.0 GRANULOMA ANNULARE: Status: ACTIVE | Noted: 2022-04-13

## 2022-04-13 PROCEDURE — 1090F PRES/ABSN URINE INCON ASSESS: CPT | Performed by: FAMILY MEDICINE

## 2022-04-13 PROCEDURE — G9899 SCRN MAM PERF RSLTS DOC: HCPCS | Performed by: FAMILY MEDICINE

## 2022-04-13 PROCEDURE — G0463 HOSPITAL OUTPT CLINIC VISIT: HCPCS | Performed by: FAMILY MEDICINE

## 2022-04-13 PROCEDURE — 93005 ELECTROCARDIOGRAM TRACING: CPT | Performed by: FAMILY MEDICINE

## 2022-04-13 PROCEDURE — G8420 CALC BMI NORM PARAMETERS: HCPCS | Performed by: FAMILY MEDICINE

## 2022-04-13 PROCEDURE — G8510 SCR DEP NEG, NO PLAN REQD: HCPCS | Performed by: FAMILY MEDICINE

## 2022-04-13 PROCEDURE — 93010 ELECTROCARDIOGRAM REPORT: CPT | Performed by: FAMILY MEDICINE

## 2022-04-13 PROCEDURE — 3017F COLORECTAL CA SCREEN DOC REV: CPT | Performed by: FAMILY MEDICINE

## 2022-04-13 PROCEDURE — 99215 OFFICE O/P EST HI 40 MIN: CPT | Performed by: FAMILY MEDICINE

## 2022-04-13 PROCEDURE — 1101F PT FALLS ASSESS-DOCD LE1/YR: CPT | Performed by: FAMILY MEDICINE

## 2022-04-13 PROCEDURE — G8536 NO DOC ELDER MAL SCRN: HCPCS | Performed by: FAMILY MEDICINE

## 2022-04-13 PROCEDURE — G8399 PT W/DXA RESULTS DOCUMENT: HCPCS | Performed by: FAMILY MEDICINE

## 2022-04-13 PROCEDURE — G8427 DOCREV CUR MEDS BY ELIG CLIN: HCPCS | Performed by: FAMILY MEDICINE

## 2022-04-13 RX ORDER — CLOBETASOL PROPIONATE 0.5 MG/G
CREAM TOPICAL 2 TIMES DAILY
COMMUNITY

## 2022-04-13 NOTE — PROGRESS NOTES
Chief Complaint   Patient presents with    Pre-op Exam     bunion surgery left foot 4/28/22 Dr Sandra Pierce      1. \"Have you been to the ER, urgent care clinic since your last visit? Hospitalized since your last visit? \" No    2. \"Have you seen or consulted any other health care providers outside of the [address] [place] [address] since your last visit? \" Yes Where: derm  @ [facility] , pod Dr Sandra Pierce    3. For patients aged 39-70: Has the patient had a colonoscopy / FIT/ Cologuard? Colonoscopy July 2021 repeat 3 years Dr Marva Alcantara      If the patient is female:    4. For patients aged 41-77: Has the patient had a mammogram within the past 2 years? In system       5. For patients aged 21-65: Has the patient had a pap smear?  Gyn Dr Marylen Golds sees every 3 years

## 2022-04-13 NOTE — PROGRESS NOTES
Chief Complaint   Patient presents with    Pre-op Exam     Bunion surgery left foot 4/28/22 Dr. Tracy Mcknight   [facility]  PRE-OP H&P    Surgery: Left Bunionectomy     Date of Surgery: 4-28-22    Surgeon: Dr. Butch Dunn    Latex Allergy: No    Allergy or Reaction to Tapes or Adhesives: No    History of Reactions to Anesthesia: No    History of Heart Disease/CAD/PTCA/CABG: No    ASA: No    Coumadin/Xarelto/Eliquis/Pradaxa: No    Nsaid's: No    Asthma/COPD/Pulmonary Disease: No    Bleeding/Bruising or Clotting Disorder: No    H/O DVT or Pulmonary Embolism: No       REVIEW OF SYMPTOMS   Review of Systems   Constitutional: Negative. HENT: Negative. Eyes: Negative. Respiratory: Negative. Cardiovascular: Negative. Gastrointestinal: Negative. Genitourinary: Negative. Musculoskeletal: Negative. Neurological: Negative. Endo/Heme/Allergies: Negative. PROBLEM LIST/MEDICAL HISTORY     Problem List  Date Reviewed: 4/13/2022          Codes Class Noted    Granuloma annulare ICD-10-CM: L92.0  ICD-9-CM: 695.89  4/13/2022    Overview Signed 4/13/2022 11:00 AM by Lucy Camilo MD     [facility] Dr. Mark Garcia             ACP (advance care planning) ICD-10-CM: Z71.89  ICD-9-CM: V65.49  1/16/2019    Overview Signed 1/16/2019  9:56 AM by Lucy Camilo MD     Discussion [phone]. Patient has an AMD. Asked to provide copy for file.               Hypercholesterolemia ICD-10-CM: E78.00  ICD-9-CM: 272.0  5/13/2011        H/O Transient Elevated liver enzymes ~2007, w/u Negative ICD-10-CM: R74.8  ICD-9-CM: 790.5  5/13/2011    Overview Signed 5/13/2011  1:06 PM by Lucy Camilo MD     2007, Hepatitis panel, GI w/u  and U/s negative             Overactive bladder ICD-10-CM: N32.81  ICD-9-CM: 596.51  5/13/2011        Hypothyroidism ICD-10-CM: E03.9  ICD-9-CM: 244.9  5/19/2010    Overview Signed 5/19/2010  3:46 PM by Ajay Santos [facility] [id number] [facility] Family history of MI (myocardial infarction) ICD-10-CM: Z82.49  ICD-9-CM: V17.3  5/19/2010    Overview Signed 5/19/2010  3:46 PM by Eleanor Camilo     Paternal at age 51 yo             Osteopenia ICD-10-CM: M85.80  ICD-9-CM: 733.90  5/19/2010    Overview Addendum 1/15/2018 10:52 AM by Barbra Dove MD     Dexa per Gyn Dr Gray Fontaine 12/2009, q 3 yrs             AR (allergic rhinitis) ICD-10-CM: J30.9  ICD-9-CM: 477.9  7/31/2002    Overview Addendum 8/28/2014  8:36 AM by Barbra Dove MD     ~2538; Fall worse season                   PAST SURGICAL HISTORY     Past Surgical History:   Procedure Laterality Date    EKG ORDERABLE  1/18/2010    Normal, NSR, rate 76    HM DEXA SCAN  12/2009    Osteopenia, per Gyn    HX COLONOSCOPY  02/2003    Normal, f/u 5 yr; Dr Ragini Trevino x 10 yrs, Dr Viky Brown HX COLONOSCOPY  07/2021    Large polyp, [facility], [facility]     [address] [place] [address]    for infertility eval    HX SALPINGO-OOPHORECTOMY  1970's    for ectopic pregnancy    US ABD COMP  9/2007, [facility]    normal     MEDICATIONS     Current Outpatient Medications   Medication Sig    clobetasoL (TEMOVATE) 0.05 % topical cream Apply  to affected area two (2) times a day.  atorvastatin (LIPITOR) 10 mg tablet TAKE 1 TABLET DAILY FOR HYPERCHOLESTEROLEMIA    levothyroxine (SYNTHROID) 50 mcg tablet TAKE 1 TABLET DAILY BEFORE BREAKFAST FOR HYPOTHYROIDISM    tolterodine ER (DETROL LA) 4 mg ER capsule TAKE 1 CAPSULE DAILY FOR BLADDER HYPERACTIVITY    mometasone (NASONEX) 50 mcg/actuation nasal spray 2 Sprays by Both Nostrils route daily. (Patient taking differently: 2 Sprays by Both Nostrils route daily as needed.)    CALCIUM CARBONATE/VITAMIN D3 (CALCIUM 600 + D PO) Take  by mouth daily.  vit A/vit C/vit E/zinc/copper (OCUVITE PRESERVISION PO) Take  by mouth daily.  (Patient not taking: Reported on 4/13/2022)    multivitamin (ONE A DAY) tablet Take 1 Tab by mouth daily. (Patient not taking: Reported on 2022)     No current facility-administered medications for this visit.       ALLERGIES     Allergies   Allergen Reactions    Bactrim [Sulfamethoprim Ds] Hives    Vytorin 10-10 [Ezetimibe-Simvastatin] Other (comments)     Elevated LFT's    Other Medication Nausea Only     Lipitor 20mg dose- caused GI upset       SOCIAL HISTORY     Social History     Tobacco Use    Smoking status: Never Smoker    Smokeless tobacco: Never Used   Substance Use Topics    Alcohol use: Yes     Comment: occasional wine     IMMUNIZATIONS     Immunization History   Administered Date(s) Administered    COVID-19, Pfizer Purple top, DILUTE for use, 12+ yrs, 30mcg/0.3mL dose 2021, 2021, 2021    Influenza High Dose Vaccine PF 10/10/2019, 10/12/2021    Influenza Vaccine 10/06/2014, 10/22/2016, 10/17/2017, 10/16/2018, 10/05/2020    Influenza Vaccine (Tri) Adjuvanted (>65 Yrs FLUAD TRI 52483) 10/11/2019    Pneumococcal Conjugate (PCV-13) 01/15/2018    Pneumococcal Polysaccharide (PPSV-23) 2013, 2019    TD Vaccine 2010    Tdap 2016    Zoster Recombinant 10/10/2018, 01/15/2019    Zoster Vaccine, Live 2014     FAMILY HISTORY     Family History   Problem Relation Age of Onset    Breast Cancer Mother         dx in her mid 66's, survivor    Hypertension Mother     Stroke Mother     Thyroid Disease Mother     Heart Disease Mother          of pneumonia age 80    Heart Disease Father     Heart Attack Father 52    High Cholesterol Brother     High Cholesterol Brother     Mental illness Brother         Mental Handicap,  in a group home    Breast Cancer Sister 48        survivor    High Cholesterol Sister     Anesth Problems Neg Hx          VITALS     Visit Vitals  /78 (BP 1 Location: Left upper arm, BP Patient Position: Sitting, BP Cuff Size: Large adult)   Pulse 90   Temp 98.6 °F (37 °C) (Oral)   Resp 16   Ht 5' 3\" (1.6 m)   Wt 138 lb 3.2 oz (62.7 kg)   SpO2 98%   BMI 24.48 kg/m²      PHYSICAL EXAMINATION   Physical Exam  Vitals reviewed. Constitutional:       General: She is not in acute distress. Appearance: Normal appearance. She is not ill-appearing. HENT:      Right Ear: Tympanic membrane normal.      Left Ear: Tympanic membrane normal.      Mouth/Throat:      Mouth: Mucous membranes are moist.      Pharynx: Oropharynx is clear. Eyes:      Conjunctiva/sclera: Conjunctivae normal.      Pupils: Pupils are equal, round, and reactive to light. Neck:      Thyroid: No thyroid mass, thyromegaly or thyroid tenderness. Vascular: No carotid bruit. Cardiovascular:      Rate and Rhythm: Normal rate and regular rhythm. Heart sounds: Normal heart sounds. No murmur heard. No gallop. Pulmonary:      Effort: Pulmonary effort is normal.      Breath sounds: Normal breath sounds. Abdominal:      General: There is no distension. Palpations: Abdomen is soft. There is no mass. Tenderness: There is no abdominal tenderness. Musculoskeletal:         General: No swelling or tenderness. Cervical back: Full passive range of motion without pain and neck supple. Right lower leg: No edema. Left lower leg: No edema. Lymphadenopathy:      Cervical: No cervical adenopathy. Neurological:      General: No focal deficit present. Mental Status: She is alert and oriented to person, place, and time. Mental status is at baseline. Cranial Nerves: No cranial nerve deficit.             LABORATORY DATA/ANCILLARY/IMAGING     Results for orders placed or performed in visit on 01/19/22   T4, FREE   Result Value Ref Range    T4, Free 1.0 0.8 - 1.5 NG/DL   TSH 3RD GENERATION   Result Value Ref Range    TSH 1.72 0.36 - 3.74 uIU/mL   LIPID PANEL   Result Value Ref Range    Cholesterol, total 190 <200 MG/DL    Triglyceride 226 (H) <150 MG/DL    HDL Cholesterol 50 MG/DL    LDL, calculated 94.8 0 - 100 MG/DL    VLDL, calculated 45.2 MG/DL    CHOL/HDL Ratio 3.8 0.0 - 5.0     METABOLIC PANEL, COMPREHENSIVE   Result Value Ref Range    Sodium 139 136 - 145 mmol/L    Potassium 4.5 3.5 - 5.1 mmol/L    Chloride 105 97 - 108 mmol/L    CO2 31 21 - 32 mmol/L    Anion gap 3 (L) 5 - 15 mmol/L    Glucose 100 65 - 100 mg/dL    BUN 10 6 - 20 MG/DL    Creatinine 0.80 0.55 - 1.02 MG/DL    BUN/Creatinine ratio 13 12 - 20      GFR est AA >60 >60 ml/min/1.73m2    GFR est non-AA >60 >60 ml/min/1.73m2    Calcium 9.5 8.5 - 10.1 MG/DL    Bilirubin, total 0.4 0.2 - 1.0 MG/DL    ALT (SGPT) 23 12 - 78 U/L    AST (SGOT) 20 15 - 37 U/L    Alk. phosphatase 72 45 - 117 U/L    Protein, total 7.1 6.4 - 8.2 g/dL    Albumin 4.0 3.5 - 5.0 g/dL    Globulin 3.1 2.0 - 4.0 g/dL    A-G Ratio 1.3 1.1 - 2.2     CBC W/O DIFF   Result Value Ref Range    WBC 7.1 3.6 - 11.0 K/uL    RBC 4.22 3.80 - 5.20 M/uL    HGB 13.1 11.5 - 16.0 g/dL    HCT 40.3 35.0 - 47.0 %    MCV 95.5 80.0 - 99.0 FL    MCH 31.0 26.0 - 34.0 PG    MCHC 32.5 30.0 - 36.5 g/dL    RDW 12.4 11.5 - 14.5 %    PLATELET 185 460 - 298 K/uL    MPV 10.8 8.9 - 12.9 FL    NRBC 0.0 0  WBC    ABSOLUTE NRBC 0.00 0.00 - 0.01 K/uL         ASSESSMENT & PLAN     1. Preop H&P for Left Bunionectomy 4-28-22, Dr. Allison Black  -     MANUEL POC EKG ROUTINE W/ 12 LEADS, INTER & REP  -     CBC W/O DIFF; Future  -     METABOLIC PANEL, BASIC; Future  -     HCG QL SERUM; Future  -     XR CHEST PA LAT; Future    2. Bunion of left foot    3. Hypercholesterolemia, maintained on statin therapy  Lab Results   Component Value Date/Time    Cholesterol, total 190 01/19/2022 09:52 AM    HDL Cholesterol 50 01/19/2022 09:52 AM    LDL, calculated 94.8 01/19/2022 09:52 AM    VLDL, calculated 45.2 01/19/2022 09:52 AM    Triglyceride 226  01/19/2022 09:52 AM    CHOL/HDL Ratio 3.8 01/19/2022 09:52 AM         4.  Hypothyroidism, clinically stable, normal TFT's on regimen of TRT  Lab Results   Component Value Date/Time    TSH 1.72 01/19/2022 09:52 AM    T4, Free 1.0 01/19/2022 09:52 AM       Chronic conditions stable. Acceptable risk for surgery. EKG Normal: NSR, Rate 77  CXR ordered today  Pre-op labs ordered today as required by surgical team. Patient is non-fasting. Medically stable and cleared for surgery pending labs and xray results.   Fax today's H&P along w/ pre-op form, labs, ekg, cxr report to fax # [phone]

## 2022-04-14 LAB
ANION GAP SERPL CALC-SCNC: 4 MMOL/L (ref 5–15)
BUN SERPL-MCNC: 14 MG/DL (ref 6–20)
BUN/CREAT SERPL: 16 (ref 12–20)
CALCIUM SERPL-MCNC: 9.5 MG/DL (ref 8.5–10.1)
CHLORIDE SERPL-SCNC: 104 MMOL/L (ref 97–108)
CO2 SERPL-SCNC: 31 MMOL/L (ref 21–32)
CREAT SERPL-MCNC: 0.85 MG/DL (ref 0.55–1.02)
ERYTHROCYTE [DISTWIDTH] IN BLOOD BY AUTOMATED COUNT: 12.5 % (ref 11.5–14.5)
GLUCOSE SERPL-MCNC: 91 MG/DL (ref 65–100)
HCG SERPL QL: NEGATIVE
HCT VFR BLD AUTO: 40 % (ref 35–47)
HGB BLD-MCNC: 12.6 G/DL (ref 11.5–16)
MCH RBC QN AUTO: 31 PG (ref 26–34)
MCHC RBC AUTO-ENTMCNC: 31.5 G/DL (ref 30–36.5)
MCV RBC AUTO: 98.3 FL (ref 80–99)
NRBC # BLD: 0 K/UL (ref 0–0.01)
NRBC BLD-RTO: 0 PER 100 WBC
PLATELET # BLD AUTO: 351 K/UL (ref 150–400)
PMV BLD AUTO: 10.9 FL (ref 8.9–12.9)
POTASSIUM SERPL-SCNC: 4.4 MMOL/L (ref 3.5–5.1)
RBC # BLD AUTO: 4.07 M/UL (ref 3.8–5.2)
SODIUM SERPL-SCNC: 139 MMOL/L (ref 136–145)
WBC # BLD AUTO: 7.1 K/UL (ref 3.6–11)

## 2022-04-14 NOTE — PROGRESS NOTES
Can advise patient her required pre-op labs were all ok, and we will fax them along w/ all the rest of her pre-op workup today (everything on your desk)

## 2022-10-17 ENCOUNTER — TELEPHONE (OUTPATIENT)
Dept: FAMILY MEDICINE CLINIC | Age: 72
End: 2022-10-17

## 2022-10-17 NOTE — TELEPHONE ENCOUNTER
Called pt and scheduled her for 12/13 @ 11:40. She will be seeing Dr Emanuel Graham in Nov.  He will be giving her the preop papers at that time.

## 2022-10-17 NOTE — TELEPHONE ENCOUNTER
----- Message from [facility] sent at 10/17/2022  2:09 PM EDT -----  Subject: Appointment Request    Reason for Call: Established Patient Appointment needed: Routine Pre-Op    QUESTIONS    Reason for appointment request? Available appointments did not meet   patient need     Additional Information for Provider? pt is requesting a pre-op appt for   bunion removal (right foot) on Jan 5, 2023 with Dr Brielle Blancas @ [facility],   Pt is requesting an appt for first week of Dec 2022. EKG needed.   ---------------------------------------------------------------------------  --------------  Carina CASTRO  [phone]; OK to leave message on voicemail  ---------------------------------------------------------------------------  --------------  SCRIPT ANSWERS  COVID Screen: Roya Solano

## 2022-12-19 ENCOUNTER — OFFICE VISIT (OUTPATIENT)
Dept: FAMILY MEDICINE CLINIC | Age: 72
End: 2022-12-19
Payer: MEDICARE

## 2022-12-19 VITALS
DIASTOLIC BLOOD PRESSURE: 76 MMHG | OXYGEN SATURATION: 97 % | WEIGHT: 139 LBS | RESPIRATION RATE: 17 BRPM | HEART RATE: 86 BPM | SYSTOLIC BLOOD PRESSURE: 132 MMHG | HEIGHT: 63 IN | TEMPERATURE: 97.4 F | BODY MASS INDEX: 24.63 KG/M2

## 2022-12-19 DIAGNOSIS — Z01.818 PREOP EXAMINATION: ICD-10-CM

## 2022-12-19 DIAGNOSIS — M21.611 BUNION OF GREAT TOE OF RIGHT FOOT: Primary | ICD-10-CM

## 2022-12-19 LAB
ANION GAP SERPL CALC-SCNC: 4 MMOL/L (ref 5–15)
BASOPHILS # BLD: 0.1 K/UL (ref 0–0.1)
BASOPHILS NFR BLD: 1 % (ref 0–1)
BUN SERPL-MCNC: 20 MG/DL (ref 6–20)
BUN/CREAT SERPL: 21 (ref 12–20)
CALCIUM SERPL-MCNC: 9.2 MG/DL (ref 8.5–10.1)
CHLORIDE SERPL-SCNC: 105 MMOL/L (ref 97–108)
CO2 SERPL-SCNC: 28 MMOL/L (ref 21–32)
CREAT SERPL-MCNC: 0.94 MG/DL (ref 0.55–1.02)
DIFFERENTIAL METHOD BLD: NORMAL
EOSINOPHIL # BLD: 0 K/UL (ref 0–0.4)
EOSINOPHIL NFR BLD: 0 % (ref 0–7)
ERYTHROCYTE [DISTWIDTH] IN BLOOD BY AUTOMATED COUNT: 12 % (ref 11.5–14.5)
EST. AVERAGE GLUCOSE BLD GHB EST-MCNC: 111 MG/DL
GLUCOSE SERPL-MCNC: 107 MG/DL (ref 65–100)
HBA1C MFR BLD: 5.5 % (ref 4–5.6)
HCT VFR BLD AUTO: 38 % (ref 35–47)
HGB BLD-MCNC: 12.7 G/DL (ref 11.5–16)
IMM GRANULOCYTES # BLD AUTO: 0 K/UL (ref 0–0.04)
IMM GRANULOCYTES NFR BLD AUTO: 0 % (ref 0–0.5)
LYMPHOCYTES # BLD: 1.8 K/UL (ref 0.8–3.5)
LYMPHOCYTES NFR BLD: 21 % (ref 12–49)
MCH RBC QN AUTO: 31.3 PG (ref 26–34)
MCHC RBC AUTO-ENTMCNC: 33.4 G/DL (ref 30–36.5)
MCV RBC AUTO: 93.6 FL (ref 80–99)
MONOCYTES # BLD: 1 K/UL (ref 0–1)
MONOCYTES NFR BLD: 12 % (ref 5–13)
NEUTS SEG # BLD: 5.5 K/UL (ref 1.8–8)
NEUTS SEG NFR BLD: 66 % (ref 32–75)
NRBC # BLD: 0 K/UL (ref 0–0.01)
NRBC BLD-RTO: 0 PER 100 WBC
PLATELET # BLD AUTO: 302 K/UL (ref 150–400)
PMV BLD AUTO: 11 FL (ref 8.9–12.9)
POTASSIUM SERPL-SCNC: 4.7 MMOL/L (ref 3.5–5.1)
RBC # BLD AUTO: 4.06 M/UL (ref 3.8–5.2)
SODIUM SERPL-SCNC: 137 MMOL/L (ref 136–145)
WBC # BLD AUTO: 8.5 K/UL (ref 3.6–11)

## 2022-12-19 PROCEDURE — 99213 OFFICE O/P EST LOW 20 MIN: CPT | Performed by: STUDENT IN AN ORGANIZED HEALTH CARE EDUCATION/TRAINING PROGRAM

## 2022-12-19 PROCEDURE — 1123F ACP DISCUSS/DSCN MKR DOCD: CPT | Performed by: STUDENT IN AN ORGANIZED HEALTH CARE EDUCATION/TRAINING PROGRAM

## 2022-12-19 PROCEDURE — G0463 HOSPITAL OUTPT CLINIC VISIT: HCPCS | Performed by: STUDENT IN AN ORGANIZED HEALTH CARE EDUCATION/TRAINING PROGRAM

## 2022-12-19 RX ORDER — PENTOXIFYLLINE 400 MG/1
TABLET, EXTENDED RELEASE ORAL
COMMUNITY
Start: 2022-11-30

## 2022-12-19 NOTE — PROGRESS NOTES
Nika Fang  81 y.o. female  1950  [address]  [id number]     [address] [facility]       12/19/2022  Chief Complaint   Patient presents with    Pre-op Exam     Patient states foot surgery will be performed on January 5th. HPI:   Nika Fang is a 81 y.o. female referred for evaluation by:Dr. Dr. Jose Chong for Pre- Op Evaluation. Please see encounter details and orders for consultative summary.      Type of surgery and indication: bunionectomy; bunion  Surgery site : right foot   Anesthesia type: general  Date of procedure:  January 5th 2022      Review of Systems   Denies cp, sob, abdominal pain, n/v/d  Inherent Risk of Surgery   Surgical risk:  intermediate  Low:  Cataract, breast, dental, endoscopy, superficial  Intermediate:  Orthopedic, abdominal, thoracic, carotid endarterctomy, prostate, head and neck  High:  Vascular, aortic, emergent, high risk of blood loss, anticipated prolonged    Patient Cardiac Risk Assessment   Revised Cardiac Risk Index (RCRI)  High Risk Surgery  Hx of Heart Failure  Hx of ischemic heart disease  Hx of CVD  DM requiring insulin therapy  Preoperative serum Cr >2.0 mg/dl    Rate if cardiac death, nonfatal MI, nonfatal cardiac arrest by number of risk factor  None - 0.4%  1  1.0%  2 2.4%  3+ 5.4%    KANU/AHA 2007 Guidelines:   1) Surgery Emergency, Non-cardiac -> to surgery  2) If not, look at clinical predictors  Major Intermediate Minor   Unstable CAD (recent MI, severe angina) Mild angina Advanced Age   Decompensated CHF Prior MI Abnormal EKG   Severe Valvular Disease Compensated/Prior CHF Rhythm other than sinus   Arrhythmias (AV block, uncontrolled vent rate, sxs) Diabetes Low METS (<4)    Renal Insuficiency Hx of CVA     Uncontrolled HTN         METS     <4 METS >4 METS   Care for self Climb a flight of stairs or a hill   Walk indoors around housse Walk on level ground at 4 mph   Walk 2-3 blocks on level ground (2-3 mph) Run a short distance   Light work around house (dust, dishes) Heavy work around house (scrub floors, move furniture)     Prior cardiac evaluation:   no    Other Risk Factors:   Screening for ETOH use:  Done and low risk  Smoking status:  no    Personal or FH of bleeding problems:  no  Personal or FH of blood clots:  no  Personal or FH of anesthesia problems:  no    Pulmonary Risk:  Asthma or COPD:  no  Obesity:  Body mass index is 24.62 kg/m². Surgery close to diaphragm:  no  Known ESVEN:  no        Past Medical, Surgical, Social History   Allergies: Allergies   Allergen Reactions    Bactrim [Sulfamethoprim Ds] Hives    Vytorin 10-10 [Ezetimibe-Simvastatin] Other (comments)     Elevated LFT's    Other Medication Nausea Only     Lipitor 20mg dose- caused GI upset 5-2010     Latex allergy: no  Prior reactions to anesthesia:  None      Current Outpatient Medications   Medication Sig    clobetasoL (TEMOVATE) 0.05 % topical cream Apply  to affected area two (2) times a day. atorvastatin (LIPITOR) 10 mg tablet TAKE 1 TABLET DAILY FOR HYPERCHOLESTEROLEMIA    levothyroxine (SYNTHROID) 50 mcg tablet TAKE 1 TABLET DAILY BEFORE BREAKFAST FOR HYPOTHYROIDISM    tolterodine ER (DETROL LA) 4 mg ER capsule TAKE 1 CAPSULE DAILY FOR BLADDER HYPERACTIVITY    vit A/vit C/vit E/zinc/copper (OCUVITE PRESERVISION PO) Take  by mouth daily. multivitamin (ONE A DAY) tablet Take 1 Tab by mouth daily. mometasone (NASONEX) 50 mcg/actuation nasal spray 2 Sprays by Both Nostrils route daily. (Patient taking differently: 2 Sprays by Both Nostrils route daily as needed.)    CALCIUM CARBONATE/VITAMIN D3 (CALCIUM 600 + D PO) Take  by mouth daily. pentoxifylline CR (TRENTAL) 400 mg CR tablet TAKE 1 TABLET BY MOUTH TWICE DAILY WITH FOOD     No current facility-administered medications for this visit. Past Medical History:   Diagnosis Date    ACP (advance care planning) 1/16/2019    Discussion 1-2019.  Patient has an AMD. Asked to provide copy for file. AR (allergic rhinitis) 7/31/2002    Family history of MI (myocardial infarction) 5/19/2010    Granuloma annulare 4/13/2022    [address] [facility] [address] Dr. Missy Gomez    H/O Transient Elevated liver enzymes ~2007, w/u Negative 5/13/2011    Hypercholesterolemia 5/13/2011    Hypothyroidism 5/19/2010    Osteopenia 5/19/2010    Overactive bladder 5/13/2011     Past Surgical History:   Procedure Laterality Date    EKG ORDERABLE  1/18/2010    Normal, NSR, rate 76    HM DEXA SCAN  12/2009    Osteopenia, per Gyn    HX COLONOSCOPY  02/2003    Normal, f/u 5 yr; Dr Natasha Matos x 10 yrs, Dr Marcus Trinidad    HX COLONOSCOPY  07/2021    Large polyp, [facility]     [address] [place] [address]    for infertility eval    HX SALPINGO-OOPHORECTOMY  1970's    for ectopic pregnancy    US ABD COMP  9/2007, [facility]    normal     Social History     Tobacco Use    Smoking status: Never    Smokeless tobacco: Never   Vaping Use    Vaping Use: Never used   Substance Use Topics    Alcohol use: Yes     Comment: occasional wine    Drug use: No       Objective     Vitals:    12/19/22 0912   BP: 132/76   Pulse: 86   Resp: 17   Temp: 97.4 °F (36.3 °C)   TempSrc: Temporal   SpO2: 97%   Weight: 139 lb (63 kg)   Height: 5' 3\" (1.6 m)       Constitutional:  Appears well,  No Acute Distress, Vitals noted  Psychiatric:   Affect normal, Alert and Oriented to person/place/time    Eyes:   Pupils equally round and reactive, EOMI, conjunctiva clear, eyelids normal  ENT:   External ears and nose normal/lips, teeth=OK/gums normal, TMs and Orophyarynx normal  Neck:   general inspection and Thyroid normal.  No abnormal cervical or supraclavicular nodes    Lungs:   clear to auscultation, good respiratory effort  Heart: Ausculation normal.  Regular rhythm. No cardiac murmurs.   No carotid bruits or palpable thrills  Chest wall normal    Extremities:   without edema, good peripheral pulses  Skin:   Warm to palpation, without rashes, bruising, or suspicious lesions     No results found for this or any previous visit (from the past 24 hour(s)). EKG Results       None            Assessment an PLan     Assessment/Plan:     1) Preoperative Clearance  Per RCRI, the patient has a 0.4% risk of cardiac death, nonfatal MI, nonfatal cardiac arrest based on risk factors of none.   Per ACC/AHA guidelines, patient is low risk for a(n) intermediate risk surgery and may proceed to planned surgery with the above noted risk  -CBC, BMP, and chest x-ray per surgery center  -Has had EKG within last year, normal         Yessi Leyva DO, [facility]

## 2022-12-19 NOTE — PROGRESS NOTES
Chief Complaint   Patient presents with    Pre-op Exam     Patient states foot surgery will be performed on January 5th. 1. \"Have you been to the ER, urgent care clinic since your last visit? Hospitalized since your last visit? \" No    2. \"Have you seen or consulted any other health care providers outside of the [address] [place] [address] since your last visit? \" No     3. For patients aged 39-70: Has the patient had a colonoscopy / FIT/ Cologuard? Yes - no Care Gap present      If the patient is female:    4. For patients aged 41-77: Has the patient had a mammogram within the past 2 years? Yes - no Care Gap present      5. For patients aged 21-65: Has the patient had a pap smear?  Yes - no Care Gap present         3 most recent PHQ Screens 4/13/2022   PHQ Not Done -   Little interest or pleasure in doing things Not at all   Feeling down, depressed, irritable, or hopeless Not at all   Total Score PHQ 2 0       Health Maintenance Due   Topic Date Due    Flu Vaccine (1) 08/01/2022    COVID-19 Vaccine (5 - Booster for ClearCycle series) 08/02/2022

## 2022-12-21 NOTE — PROGRESS NOTES
Labs have been printed and faxed to [facility] at # [phone].

## 2023-02-16 ENCOUNTER — OFFICE VISIT (OUTPATIENT)
Dept: FAMILY MEDICINE CLINIC | Age: 73
End: 2023-02-16
Payer: MEDICARE

## 2023-02-16 VITALS
DIASTOLIC BLOOD PRESSURE: 84 MMHG | RESPIRATION RATE: 16 BRPM | WEIGHT: 139.2 LBS | BODY MASS INDEX: 24.66 KG/M2 | HEART RATE: 88 BPM | TEMPERATURE: 98.7 F | HEIGHT: 63 IN | SYSTOLIC BLOOD PRESSURE: 132 MMHG | OXYGEN SATURATION: 98 %

## 2023-02-16 DIAGNOSIS — E78.00 HYPERCHOLESTEROLEMIA: ICD-10-CM

## 2023-02-16 DIAGNOSIS — Z00.00 MEDICARE ANNUAL WELLNESS VISIT, SUBSEQUENT: Primary | ICD-10-CM

## 2023-02-16 DIAGNOSIS — N32.81 OVERACTIVE BLADDER: ICD-10-CM

## 2023-02-16 DIAGNOSIS — E03.9 ACQUIRED HYPOTHYROIDISM: ICD-10-CM

## 2023-02-16 LAB
ALBUMIN SERPL-MCNC: 3.9 G/DL (ref 3.5–5)
ALBUMIN/GLOB SERPL: 1.3 (ref 1.1–2.2)
ALP SERPL-CCNC: 64 U/L (ref 45–117)
ALT SERPL-CCNC: 23 U/L (ref 12–78)
ANION GAP SERPL CALC-SCNC: 5 MMOL/L (ref 5–15)
AST SERPL-CCNC: 17 U/L (ref 15–37)
BILIRUB SERPL-MCNC: 0.3 MG/DL (ref 0.2–1)
BUN SERPL-MCNC: 14 MG/DL (ref 6–20)
BUN/CREAT SERPL: 17 (ref 12–20)
CALCIUM SERPL-MCNC: 9.5 MG/DL (ref 8.5–10.1)
CHLORIDE SERPL-SCNC: 104 MMOL/L (ref 97–108)
CHOLEST SERPL-MCNC: 225 MG/DL
CO2 SERPL-SCNC: 31 MMOL/L (ref 21–32)
CREAT SERPL-MCNC: 0.84 MG/DL (ref 0.55–1.02)
GLOBULIN SER CALC-MCNC: 3.1 G/DL (ref 2–4)
GLUCOSE SERPL-MCNC: 98 MG/DL (ref 65–100)
HDLC SERPL-MCNC: 49 MG/DL
HDLC SERPL: 4.6 (ref 0–5)
LDLC SERPL CALC-MCNC: 112.6 MG/DL (ref 0–100)
POTASSIUM SERPL-SCNC: 4.4 MMOL/L (ref 3.5–5.1)
PROT SERPL-MCNC: 7 G/DL (ref 6.4–8.2)
SODIUM SERPL-SCNC: 140 MMOL/L (ref 136–145)
T4 FREE SERPL-MCNC: 1.1 NG/DL (ref 0.8–1.5)
TRIGL SERPL-MCNC: 317 MG/DL (ref ?–150)
TSH SERPL DL<=0.05 MIU/L-ACNC: 1.4 UIU/ML (ref 0.36–3.74)
VLDLC SERPL CALC-MCNC: 63.4 MG/DL

## 2023-02-16 PROCEDURE — G0463 HOSPITAL OUTPT CLINIC VISIT: HCPCS | Performed by: FAMILY MEDICINE

## 2023-02-16 PROCEDURE — G8427 DOCREV CUR MEDS BY ELIG CLIN: HCPCS | Performed by: FAMILY MEDICINE

## 2023-02-16 PROCEDURE — 3017F COLORECTAL CA SCREEN DOC REV: CPT | Performed by: FAMILY MEDICINE

## 2023-02-16 PROCEDURE — 99214 OFFICE O/P EST MOD 30 MIN: CPT | Performed by: FAMILY MEDICINE

## 2023-02-16 PROCEDURE — 1101F PT FALLS ASSESS-DOCD LE1/YR: CPT | Performed by: FAMILY MEDICINE

## 2023-02-16 PROCEDURE — G8536 NO DOC ELDER MAL SCRN: HCPCS | Performed by: FAMILY MEDICINE

## 2023-02-16 PROCEDURE — G9899 SCRN MAM PERF RSLTS DOC: HCPCS | Performed by: FAMILY MEDICINE

## 2023-02-16 PROCEDURE — 1123F ACP DISCUSS/DSCN MKR DOCD: CPT | Performed by: FAMILY MEDICINE

## 2023-02-16 PROCEDURE — G0439 PPPS, SUBSEQ VISIT: HCPCS | Performed by: FAMILY MEDICINE

## 2023-02-16 PROCEDURE — 1090F PRES/ABSN URINE INCON ASSESS: CPT | Performed by: FAMILY MEDICINE

## 2023-02-16 PROCEDURE — G8420 CALC BMI NORM PARAMETERS: HCPCS | Performed by: FAMILY MEDICINE

## 2023-02-16 PROCEDURE — G8399 PT W/DXA RESULTS DOCUMENT: HCPCS | Performed by: FAMILY MEDICINE

## 2023-02-16 PROCEDURE — G8510 SCR DEP NEG, NO PLAN REQD: HCPCS | Performed by: FAMILY MEDICINE

## 2023-02-16 RX ORDER — TOLTERODINE 4 MG/1
CAPSULE, EXTENDED RELEASE ORAL
Qty: 90 CAPSULE | Refills: 3 | Status: SHIPPED | OUTPATIENT
Start: 2023-02-16

## 2023-02-16 NOTE — PROGRESS NOTES
This is the Subsequent Medicare Annual Wellness Exam, performed 12 months or more after the Initial AWV or the last Subsequent AWV    I have reviewed the patient's medical history in detail and updated the computerized patient record. Assessment/Plan   Education and counseling provided:  Are appropriate based on today's review and evaluation    1. Medicare annual wellness visit, subsequent     Depression Risk Factor Screening     3 most recent PHQ Screens 2/16/2023   PHQ Not Done -   Little interest or pleasure in doing things Not at all   Feeling down, depressed, irritable, or hopeless Not at all   Total Score PHQ 2 0       Alcohol & Drug Abuse Risk Screen    Do you average more than 1 drink per night or more than 7 drinks a week:  No    On any one occasion in the past three months have you have had more than 3 drinks containing alcohol:  No          Functional Ability and Level of Safety    Hearing: Hearing is good. Activities of Daily Living: The home contains: no safety equipment. Patient does total self care  ADL Assessment 2/16/2023   Feeding yourself No Help Needed   Getting from bed to chair No Help Needed   Getting dressed No Help Needed   Bathing or showering No Help Needed   Walk across the room (includes cane/walker) No Help Needed   Using the telphone No Help Needed   Taking your medications No Help Needed   Preparing meals No Help Needed   Managing money (expenses/bills) No Help Needed   Moderately strenuous housework (laundry) No Help Needed   Shopping for personal items (toiletries/medicines) No Help Needed   Shopping for groceries No Help Needed   Driving No Help Needed   Climbing a flight of stairs No Help Needed   Getting to places beyond walking distances No Help Needed           Ambulation: with no difficulty     Fall Risk:  Fall Risk Assessment, last 12 mths 2/16/2023   Able to walk? Yes   Fall in past 12 months? 0   Do you feel unsteady?  0   Are you worried about falling 0 Abuse Screen:  Patient is not abused       Cognitive Screening    Has your family/caregiver stated any concerns about your memory: no         Health Maintenance Due     Health Maintenance Due   Topic Date Due    Lipid Screen  01/19/2023    Breast Cancer Screen Mammogram  01/25/2023       Patient Care Team   Patient Care Team:  Macey Chávez MD as PCP - General  Macey Chávez MD as PCP - [facility]  Lulu Canavan, MD (Obstetrics & Gynecology)  Lorene Senior MD (Obstetrics & Gynecology)    History     Patient Active Problem List   Diagnosis Code    Acquired hypothyroidism E03.9    Family history of MI (myocardial infarction) Z82.49    AR (allergic rhinitis) J30.9    Osteopenia M85.80    Hypercholesterolemia E78.00    H/O Transient Elevated liver enzymes ~2007, w/u Negative R74.8    Overactive bladder N32.81    ACP (advance care planning) Z71.89    Granuloma annulare L92.0     Past Medical History:   Diagnosis Date    ACP (advance care planning) 1/16/2019    Discussion 1-2019. Patient has an AMD. Asked to provide copy for file.      Acquired hypothyroidism 5/19/2010 2000    AR (allergic rhinitis) 7/31/2002    Family history of MI (myocardial infarction) 5/19/2010    Granuloma annulare 4/13/2022    [facility] Ary [facility]    H/O Transient Elevated liver enzymes ~2007, w/u Negative 5/13/2011    Hypercholesterolemia 5/13/2011    Osteopenia 5/19/2010    Overactive bladder 5/13/2011      Past Surgical History:   Procedure Laterality Date    EKG ORDERABLE  01/18/2010    Normal, NSR, rate 76    HM DEXA SCAN  12/2009    Osteopenia, per Gyn    HX BUNIONECTOMY Right 01/05/2023    HX BUNIONECTOMY Left 04/28/2022    HX COLONOSCOPY  02/2003    Normal, f/u 5 yr; Dr Silvio Bernal x 10 yrs, Dr Karolina Ponce    HX COLONOSCOPY  07/2021    Large Polyp, Repeat 3 yrs, [facility]    [address] [place] [address]    for infertility eval    HX SALPINGO-OOPHORECTOMY  s    for ectopic pregnancy    US ABD COMP  2007, [facility]    normal     Current Outpatient Medications   Medication Sig Dispense Refill    pentoxifylline CR (TRENTAL) 400 mg CR tablet Per Dermatologist Dr. Brian Diaz for Granuloma Annulare since       atorvastatin (LIPITOR) 10 mg tablet TAKE 1 TABLET DAILY FOR HYPERCHOLESTEROLEMIA 90 Tablet 3    levothyroxine (SYNTHROID) 50 mcg tablet TAKE 1 TABLET DAILY BEFORE BREAKFAST FOR HYPOTHYROIDISM 90 Tablet 3    tolterodine ER (DETROL LA) 4 mg ER capsule TAKE 1 CAPSULE DAILY FOR BLADDER HYPERACTIVITY 90 Capsule 3    vit A/vit C/vit E/zinc/copper (OCUVITE PRESERVISION PO) Take  by mouth daily. multivitamin (ONE A DAY) tablet Take 1 Tab by mouth daily. CALCIUM CARBONATE/VITAMIN D3 (CALCIUM 600 + D PO) Take  by mouth daily.        Allergies   Allergen Reactions    Bactrim [Sulfamethoprim Ds] Hives    Oxycodone Nausea and Vomiting    Vytorin 10-10 [Ezetimibe-Simvastatin] Other (comments)     Elevated LFT's    Other Medication Nausea Only     Lipitor 20mg dose- caused GI upset        Family History   Problem Relation Age of Onset    Breast Cancer Mother         dx in her mid 66's, survivor    Hypertension Mother     Stroke Mother     Thyroid Disease Mother     Heart Disease Mother          of pneumonia age 80    Heart Disease Father     Heart Attack Father 52    High Cholesterol Brother     High Cholesterol Brother     Mental illness Brother         Mental Handicap,  in a group home    Breast Cancer Sister 48        survivor    High Cholesterol Sister     Anesth Problems Neg Hx      Social History     Tobacco Use    Smoking status: Never    Smokeless tobacco: Never   Substance Use Topics    Alcohol use: Yes     Comment: occasional wine         Rafa Charles MD

## 2023-02-16 NOTE — PROGRESS NOTES
Chief Complaint   Patient presents with    Annual Wellness Visit    Cholesterol Problem     Fasting follow up    Thyroid Problem       HISTORY OF PRESENT ILLNESS   Fasting follow up hypercholesterolemia, hypothyroidism, labs and medication check. Complying routinely w/ medication regimen updated below and tolerating w/o reaction or side effects. Diet: nothing special  Exercise: usually walks 2 miles a day/40 minutes but on hold since bunion surgery 1-5-23, has follow up w/ podiatrist 3/2023 to see if she can start walking again  Caffeine: 1-2 cups of coffee qam  Weight: stable in the 130's x years     REVIEW OF SYMPTOMS   Review of Systems   Constitutional: Negative. HENT: Negative. Eyes: Negative. Respiratory: Negative. Cardiovascular: Negative. Gastrointestinal: Negative. Genitourinary: Negative. OAB stable on regimen of Detrol    Musculoskeletal: Negative. Negative for myalgias. Neurological: Negative. Endo/Heme/Allergies: Negative. Psychiatric/Behavioral: Negative. PROBLEM LIST/MEDICAL HISTORY     Problem List  Date Reviewed: 2/16/2023            Codes Class Noted    Granuloma annulare ICD-10-CM: L92.0  ICD-9-CM: 695.89  4/13/2022    Overview Addendum 2/16/2023 10:05 AM by Shawn Carbajal MD     [facility] Sravan Can, then Dr. Pat He             ACP (advance care planning) ICD-10-CM: Z71.89  ICD-9-CM: V65.49  1/16/2019    Overview Signed 1/16/2019  9:56 AM by Shawn Carbajal MD     Discussion [phone]. Patient has an AMD. Asked to provide copy for file.               Hypercholesterolemia ICD-10-CM: E78.00  ICD-9-CM: 272.0  5/13/2011        H/O Transient Elevated liver enzymes ~2007, w/u Negative ICD-10-CM: R74.8  ICD-9-CM: 790.5  5/13/2011    Overview Signed 5/13/2011  1:06 PM by Shawn Carbajal MD     2007, Hepatitis panel, GI w/u  and U/s negative             Overactive bladder ICD-10-CM: N32.81  ICD-9-CM: 596.51  5/13/2011 Acquired hypothyroidism ICD-10-CM: E03.9  ICD-9-CM: 244.9  5/19/2010    Overview Signed 5/19/2010  3:46 PM by Ajay Quispecarrington [address], [address] [facility]             Family history of MI (myocardial infarction) ICD-10-CM: Z82.49  ICD-9-CM: V17.3  5/19/2010    Overview Signed 5/19/2010  3:46 PM by Gabbie Neena     Paternal at age 51 yo             Osteopenia ICD-10-CM: M85.80  ICD-9-CM: 733.90  5/19/2010    Overview Addendum 1/15/2018 10:52 AM by Ksenia Wheatley MD     Dexa per Gyn Dr Roz Schaumann 12/2009, q 3 yrs             AR (allergic rhinitis) ICD-10-CM: J30.9  ICD-9-CM: 477.9  7/31/2002    Overview Addendum 8/28/2014  8:36 AM by Ksenia Wheatley MD     ~5186; Fall worse season                    PAST SURGICAL HISTORY     Past Surgical History:   Procedure Laterality Date    EKG ORDERABLE  01/18/2010    Normal, NSR, rate 76    HM DEXA SCAN  12/2009    Osteopenia, per Gyn    HX BUNIONECTOMY Right 01/05/2023    HX BUNIONECTOMY Left 04/28/2022    HX COLONOSCOPY  02/2003    Normal, f/u 5 yr; Dr Payal Hui    HX COLONOSCOPY  2011    [place] [facility] x 10 yrs, Dr Mindy Arnold    HX COLONOSCOPY  07/2021    Large Polyp, Repeat 3 yrs, [facility]    [address] [place], [place] [address]    for infertility eval    HX SALPINGO-OOPHORECTOMY  1970's    for ectopic pregnancy    US ABD COMP  9/2007, [facility]    normal         MEDICATIONS     Current Outpatient Medications   Medication Sig    pentoxifylline CR (TRENTAL) 400 mg CR tablet Per Dermatologist Dr. Daisy Oh for Granuloma Annulare since     atorvastatin (LIPITOR) 10 mg tablet TAKE 1 TABLET DAILY FOR HYPERCHOLESTEROLEMIA    levothyroxine (SYNTHROID) 50 mcg tablet TAKE 1 TABLET DAILY BEFORE BREAKFAST FOR HYPOTHYROIDISM    tolterodine ER (DETROL LA) 4 mg ER capsule TAKE 1 CAPSULE DAILY FOR BLADDER HYPERACTIVITY    vit A/vit C/vit E/zinc/copper (OCUVITE PRESERVISION PO) Take  by mouth daily. multivitamin (ONE A DAY) tablet Take 1 Tab by mouth daily.     CALCIUM CARBONATE/VITAMIN D3 (CALCIUM 600 + D PO) Take  by mouth daily. No current facility-administered medications for this visit.           ALLERGIES     Allergies   Allergen Reactions    Bactrim [Sulfamethoprim Ds] Hives    Oxycodone Nausea and Vomiting    Vytorin 10-10 [Ezetimibe-Simvastatin] Other (comments)     Elevated LFT's    Other Medication Nausea Only     Lipitor 20mg dose- caused GI upset 5-2010          SOCIAL HISTORY     Social History     Tobacco Use    Smoking status: Never    Smokeless tobacco: Never   Substance Use Topics    Alcohol use: Yes     Comment: occasional wine     Social History     Social History Narrative        2 adopted children    Patient and  live independently in their home in [place]    Their daughter, son in law and grand daughter live nearby    Occupation: HR    Retired 2019        Diet: nothing special    Exercise: usually walks 2 miles a day/40 minutes but on hold since bunion surgery 1-5-23, has follow up w/ podiatrist 3/2023 to see if she can start walking again    Caffeine: 1-2 cups of coffee qam    Weight: stable in the 130's x years         Social History     Substance and Sexual Activity   Sexual Activity Yes    Partners: Male       IMMUNIZATIONS     Immunization History   Administered Date(s) Administered    COVID-19, PFIZER GRAY top, DO NOT Dilute, (age 15 y+), IM, 30 mcg/0.3 mL 06/07/2022    COVID-19, PFIZER PURPLE top, DILUTE for use, (age 15 y+), IM, 30mcg/0.3mL 03/02/2021, 03/24/2021, 09/27/2021, 06/07/2022, 11/21/2022    Influenza High Dose Vaccine PF 10/10/2019, 10/12/2021, 10/18/2022    Influenza Vaccine 12/29/2004, 09/19/2013, 10/06/2014, 10/22/2016, 10/17/2017, 10/16/2018, 10/05/2020, 10/19/2022    Influenza Vaccine (Tri) Adjuvanted (>65 Yrs FLUAD TRI 14548) 10/20/2017, 10/11/2019    Influenza Vaccine PF 09/30/2015, 10/22/2016    Influenza, FLUAD, (age 72 y+), Adjuvanted 10/05/2020, 10/12/2021    Novel Influenza-H1N1-09, All Formulations 02/06/2010    Pneumococcal Conjugate (PCV-13) 01/15/2018    Pneumococcal Polysaccharide (PPSV-23) 2013, 2019    TD Vaccine 2010    Td, Adsorbed PF 2010    Tdap 2016    Zoster Recombinant 2018, 10/10/2018, 01/15/2019    Zoster Vaccine, Live 2014         FAMILY HISTORY     Family History   Problem Relation Age of Onset    Breast Cancer Mother         dx in her mid 66's, survivor    Hypertension Mother     Stroke Mother     Thyroid Disease Mother     Heart Disease Mother          of pneumonia age 80    Heart Disease Father     Heart Attack Father 52    High Cholesterol Brother     High Cholesterol Brother     Mental illness Brother         Mental Handicap,  in a group home    Breast Cancer Sister 48        survivor    High Cholesterol Sister     Anesth Problems Neg Hx          VITALS   Visit Vitals  /84 (BP 1 Location: Left upper arm, BP Patient Position: Sitting, BP Cuff Size: Large adult)   Pulse 88   Temp 98.7 °F (37.1 °C) (Oral)   Resp 16   Ht 5' 3\" (1.6 m)   Wt 139 lb 3.2 oz (63.1 kg)   SpO2 98%   BMI 24.66 kg/m²          PHYSICAL EXAMINATION   Physical Exam  Vitals reviewed. Constitutional:       General: She is not in acute distress. Appearance: Normal appearance. HENT:      Right Ear: Tympanic membrane normal.      Left Ear: Tympanic membrane normal.   Eyes:      Conjunctiva/sclera: Conjunctivae normal.   Neck:      Thyroid: No thyroid mass, thyromegaly or thyroid tenderness. Vascular: No carotid bruit. Cardiovascular:      Rate and Rhythm: Normal rate and regular rhythm. Heart sounds: Normal heart sounds. Pulmonary:      Effort: Pulmonary effort is normal.      Breath sounds: Normal breath sounds. Abdominal:      General: There is no distension. Palpations: Abdomen is soft. Tenderness: There is no abdominal tenderness. Musculoskeletal:         General: No swelling or tenderness. Cervical back: Neck supple.       Right lower leg: No edema. Left lower leg: No edema. Lymphadenopathy:      Cervical: No cervical adenopathy. Neurological:      General: No focal deficit present. Mental Status: She is alert and oriented to person, place, and time. Mental status is at baseline. Cranial Nerves: No cranial nerve deficit. Motor: No weakness. Gait: Gait normal.   Psychiatric:         Mood and Affect: Mood and affect normal.         Cognition and Memory: Cognition and memory normal.             ASSESSMENT & PLAN   Diagnoses and all orders for this visit:    1. Medicare annual wellness visit, subsequent  See separate note under this same encounter visit for Medicare Wellness note. Age/risk based screening recommendations, health maintenance & prevention counseling. Cancer screening USPTFS guidelines reviewed w/ pt today. Discussed benefits/positive/negative outcomes of screening based on age/risk stratification. Informed consent for/against screening based on pt's personal hx/risk factors. Updated in history above and health maintenance. 2. Hypercholesterolemia  Maintained on statin therapy w/ Lipitor 10 mg daily  Fasting lipids and lft's checked today. If stable, renew rx for 90 day mail order  -     LIPID PANEL; Future  -     METABOLIC PANEL, COMPREHENSIVE; Future    3. Acquired hypothyroidism  Clinically stable on current regimen of Synthroid 50 mcg daily  Check TFT's today, further recs after that time  -     TSH 3RD GENERATION; Future  -     T4, FREE; Future    4. Overactive bladder  Stable, controlled on current regimen of Detrol renewed today as ordered  -     tolterodine ER (DETROL LA) 4 mg ER capsule; TAKE 1 CAPSULE DAILY FOR BLADDER HYPERACTIVITY      Fasting labs checked today  Lipid/LDL goals assessed  Reviewed diet, nutrition, exercise, weight management, BMI/goals. Reviewed medications and side effects. Doing well on current regimen. Further follow up & other recommendations pending review of labs. If all remains good and stable, follow up in 1 yr, sooner prn  She will need Lipitor and Synthroid renewed for 90 day supply to [facility] after review of today's labs

## 2023-02-16 NOTE — PROGRESS NOTES
Chief Complaint   Patient presents with    Annual Wellness Visit    Cholesterol Problem     Fasting follow up    Thyroid Problem     1. \"Have you been to the ER, urgent care clinic since your last visit? Hospitalized since your last visit? \" No    2. \"Have you seen or consulted any other health care providers outside of the [address] [place] [address] since your last visit? \" Kassidy Bautista for bunion surgery     3. For patients aged 39-70: Has the patient had a colonoscopy / FIT/ Cologuard? Yes - no Care Gap present 7/2021 Large polyp, repeat 3 yrs[facility]       If the patient is female:    4. For patients aged 41-77: Has the patient had a mammogram within the past 2 years? Yes - no Care Gap present 1/2022 done @ [address]      5. For patients aged 21-65: Has the patient had a pap smear?  NA - based on age or sex

## 2023-02-16 NOTE — PATIENT INSTRUCTIONS
Medicare Wellness Visit, Female     The best way to live healthy is to have a lifestyle where you eat a well-balanced diet, exercise regularly, limit alcohol use, and quit all forms of tobacco/nicotine, if applicable. Regular preventive services are another way to keep healthy. Preventive services (vaccines, screening tests, monitoring & exams) can help personalize your care plan, which helps you manage your own care. Screening tests can find health problems at the earliest stages, when they are easiest to treat. [facility] follows the current, evidence-based guidelines published by the [facility] ([facility]) when recommending preventive services for our patients. Because we follow these guidelines, sometimes recommendations change over time as research supports it. (For example, mammograms used to be recommended annually. Even though Medicare will still pay for an annual mammogram, the newer guidelines recommend a mammogram every two years for women of average risk). Of course, you and your doctor may decide to screen more often for some diseases, based on your risk and your co-morbidities (chronic disease you are already diagnosed with). Preventive services for you include:  - Medicare offers their members a free annual wellness visit, which is time for you and your primary care provider to discuss and plan for your preventive service needs.  Take advantage of this benefit every year!    -Over the age of 72 should receive the recommended pneumonia vaccines.    -All adults should have a flu vaccine yearly.  -All adults should have a tetanus vaccine every 10 years.   -Over the age 48 should receive the shingles vaccines.        -All adults should be screened once for Hepatitis C.  -All adults age 38-68 who are overweight should have a diabetes screening test once every three years.   -Other screening tests and preventive services for persons with diabetes include: an eye exam to screen for diabetic retinopathy, a kidney function test, a foot exam, and stricter control over your cholesterol.   -Cardiovascular screening for adults with routine risk involves an electrocardiogram (ECG) at intervals determined by your doctor.     -Colorectal cancer screenings should be done for adults age 39-70 with no increased risk factors for colorectal cancer. There are a number of acceptable methods of screening for this type of cancer. Each test has its own benefits and drawbacks. Discuss with your doctor what is most appropriate for you during your annual wellness visit. The different tests include: colonoscopy (considered the best screening method), a fecal occult blood test, a fecal DNA test, and sigmoidoscopy.    -Lung cancer screening is recommended annually with a low dose CT scan for adults between age 54 and 68, who have smoked at least 30 pack years (equivalent of 1 pack per day for 30 days), and who is a current smoker or quit less than 15 years ago.    -A bone mass density test is recommended when a woman turns 65 to screen for osteoporosis. This test is only recommended one time, as a screening. Some providers will use this same test as a disease monitoring tool if you already have osteoporosis. -Breast cancer screenings are recommended every other year for women of normal risk, age 54-69.    -Cervical cancer screenings for women over age 72 are only recommended with certain risk factors.      Here is a list of your current Health Maintenance items (your personalized list of preventive services) with a due date:  Health Maintenance Due   Topic Date Due    Cholesterol Test   01/19/2023    Mammogram  01/25/2023

## 2023-02-21 DIAGNOSIS — E78.00 HYPERCHOLESTEROLEMIA: ICD-10-CM

## 2023-02-21 DIAGNOSIS — E03.9 HYPOTHYROIDISM, UNSPECIFIED TYPE: ICD-10-CM

## 2023-02-21 DIAGNOSIS — E78.00 HYPERCHOLESTEROLEMIA: Primary | ICD-10-CM

## 2023-02-21 RX ORDER — ATORVASTATIN CALCIUM 10 MG/1
TABLET, FILM COATED ORAL
Qty: 90 TABLET | Refills: 1 | Status: SHIPPED | OUTPATIENT
Start: 2023-02-21

## 2023-02-21 RX ORDER — LEVOTHYROXINE SODIUM 50 UG/1
TABLET ORAL
Qty: 90 TABLET | Refills: 3 | Status: SHIPPED | OUTPATIENT
Start: 2023-02-21

## 2023-08-23 ENCOUNTER — OFFICE VISIT (OUTPATIENT)
Age: 73
End: 2023-08-23
Payer: MEDICARE

## 2023-08-23 VITALS
SYSTOLIC BLOOD PRESSURE: 128 MMHG | TEMPERATURE: 98.3 F | HEIGHT: 63 IN | WEIGHT: 137.4 LBS | DIASTOLIC BLOOD PRESSURE: 74 MMHG | HEART RATE: 78 BPM | BODY MASS INDEX: 24.34 KG/M2 | RESPIRATION RATE: 16 BRPM | OXYGEN SATURATION: 98 %

## 2023-08-23 DIAGNOSIS — E78.2 MIXED HYPERLIPIDEMIA: Primary | ICD-10-CM

## 2023-08-23 PROCEDURE — 99213 OFFICE O/P EST LOW 20 MIN: CPT | Performed by: FAMILY MEDICINE

## 2023-08-23 PROCEDURE — 3017F COLORECTAL CA SCREEN DOC REV: CPT | Performed by: FAMILY MEDICINE

## 2023-08-23 PROCEDURE — G8427 DOCREV CUR MEDS BY ELIG CLIN: HCPCS | Performed by: FAMILY MEDICINE

## 2023-08-23 PROCEDURE — 1123F ACP DISCUSS/DSCN MKR DOCD: CPT | Performed by: FAMILY MEDICINE

## 2023-08-23 PROCEDURE — 1036F TOBACCO NON-USER: CPT | Performed by: FAMILY MEDICINE

## 2023-08-23 PROCEDURE — 1090F PRES/ABSN URINE INCON ASSESS: CPT | Performed by: FAMILY MEDICINE

## 2023-08-23 PROCEDURE — G8420 CALC BMI NORM PARAMETERS: HCPCS | Performed by: FAMILY MEDICINE

## 2023-08-23 PROCEDURE — G8400 PT W/DXA NO RESULTS DOC: HCPCS | Performed by: FAMILY MEDICINE

## 2023-08-23 SDOH — ECONOMIC STABILITY: INCOME INSECURITY: HOW HARD IS IT FOR YOU TO PAY FOR THE VERY BASICS LIKE FOOD, HOUSING, MEDICAL CARE, AND HEATING?: NOT HARD AT ALL

## 2023-08-23 SDOH — ECONOMIC STABILITY: FOOD INSECURITY: WITHIN THE PAST 12 MONTHS, YOU WORRIED THAT YOUR FOOD WOULD RUN OUT BEFORE YOU GOT MONEY TO BUY MORE.: NEVER TRUE

## 2023-08-23 SDOH — ECONOMIC STABILITY: FOOD INSECURITY: WITHIN THE PAST 12 MONTHS, THE FOOD YOU BOUGHT JUST DIDN'T LAST AND YOU DIDN'T HAVE MONEY TO GET MORE.: NEVER TRUE

## 2023-08-23 SDOH — ECONOMIC STABILITY: HOUSING INSECURITY
IN THE LAST 12 MONTHS, WAS THERE A TIME WHEN YOU DID NOT HAVE A STEADY PLACE TO SLEEP OR SLEPT IN A SHELTER (INCLUDING NOW)?: NO

## 2023-08-23 ASSESSMENT — ENCOUNTER SYMPTOMS
GASTROINTESTINAL NEGATIVE: 1
RESPIRATORY NEGATIVE: 1

## 2023-08-23 ASSESSMENT — PATIENT HEALTH QUESTIONNAIRE - PHQ9
SUM OF ALL RESPONSES TO PHQ QUESTIONS 1-9: 0
2. FEELING DOWN, DEPRESSED OR HOPELESS: 0
SUM OF ALL RESPONSES TO PHQ9 QUESTIONS 1 & 2: 0
SUM OF ALL RESPONSES TO PHQ QUESTIONS 1-9: 0
1. LITTLE INTEREST OR PLEASURE IN DOING THINGS: 0

## 2023-08-23 NOTE — PROGRESS NOTES
Chief Complaint   Patient presents with    Cholesterol Problem     6 month fasting follow up     HISTORY OF PRESENT ILLNESS   HPI  6 month fasting follow up hyperlipidemia. Patient has been maintained on Lipitor 10 mg daily over the years but at her last annual fasting wellness check in 2-2023 her cholesterol numbers had gone up from baseline. She attributed this to her inability to do her usual daily walking regimen having had foot surgery several weeks prior to her checkup. She is now back on track and presents today for recheck to make sure her levels are improving back to baseline. In general she is feeling really well and has no complaints or concerns at this time. Diet: just tries to follow a sensible balanced diet  Caffeine: 1-2 cups of coffee qam    Exercise: had been on hold since bunion surgery 1-5-23, but since 6/2023 is back to her usual daily walks x 40-60 minutes qam  Weight: stable in the 130's x years       REVIEW OF SYMPTOMS   Review of Systems   Constitutional: Negative. Respiratory: Negative. Cardiovascular: Negative. Gastrointestinal: Negative. Musculoskeletal:  Negative for myalgias. Neurological: Negative. PROBLEM LIST/MEDICAL HISTORY     Patient Active Problem List    Diagnosis Date Noted    Granuloma annulare 04/13/2022     Overview Note:     [facility] Dermatology Dr. Eliza Reynoso, then Dr. Diane Carbone (advance care planning) 01/16/2019     Overview Note:     Discussion 1-2019. Patient has an AMD. Asked to provide copy for file.            Elevated liver enzymes 05/13/2011     Overview Note:     2007, Hepatitis panel, GI w/u  and U/s negative          Overactive bladder 05/13/2011    Mixed hyperlipidemia 05/13/2011    Family history of MI (myocardial infarction) 05/19/2010     Overview Note:     Father MI at age 51 yo        Osteopenia 05/19/2010     Overview Note:     Dexa per Gyn Dr Radha Erwin 12/2009, q 3 yrs          Acquired hypothyroidism 05/19/2010

## 2023-08-23 NOTE — PROGRESS NOTES
Chief Complaint   Patient presents with    Cholesterol Problem     6 month fasting follow up     1. \"Have you been to the ER, urgent care clinic since your last visit? Hospitalized since your last visit? \" No    2. \"Have you seen or consulted any other health care providers outside of the [address] since your last visit? \" Ditpi Mendoza last week    3. For patients aged 43-73: Has the patient had a colonoscopy / FIT/ Cologuard? Yes - no Care Gap present 7/2021 Large Polyp, Repeat 3 yrs, [facility]      If the patient is female:    4. For patients aged 43-66: Has the patient had a mammogram within the past 2 years? Yes - no Care Gap present 3/2023 Yunior Marie      5. For patients aged 21-65: Has the patient had a pap smear?  NA - based on age or sex

## 2023-08-24 LAB
CHOLEST SERPL-MCNC: 194 MG/DL
HDLC SERPL-MCNC: 49 MG/DL
HDLC SERPL: 4 (ref 0–5)
LDLC SERPL CALC-MCNC: 100.8 MG/DL (ref 0–100)
TRIGL SERPL-MCNC: 221 MG/DL
VLDLC SERPL CALC-MCNC: 44.2 MG/DL

## 2023-08-26 RX ORDER — ATORVASTATIN CALCIUM 10 MG/1
TABLET, FILM COATED ORAL
Qty: 90 TABLET | Refills: 1 | Status: SHIPPED | OUTPATIENT
Start: 2023-08-26

## 2024-02-22 ENCOUNTER — OFFICE VISIT (OUTPATIENT)
Age: 74
End: 2024-02-22
Payer: MEDICARE

## 2024-02-22 VITALS
DIASTOLIC BLOOD PRESSURE: 80 MMHG | TEMPERATURE: 98.2 F | HEIGHT: 63 IN | SYSTOLIC BLOOD PRESSURE: 128 MMHG | BODY MASS INDEX: 24.7 KG/M2 | WEIGHT: 139.4 LBS | RESPIRATION RATE: 16 BRPM | HEART RATE: 78 BPM | OXYGEN SATURATION: 98 %

## 2024-02-22 DIAGNOSIS — N32.81 OVERACTIVE BLADDER: ICD-10-CM

## 2024-02-22 DIAGNOSIS — E03.9 ACQUIRED HYPOTHYROIDISM: ICD-10-CM

## 2024-02-22 DIAGNOSIS — E78.2 MIXED HYPERLIPIDEMIA: Primary | ICD-10-CM

## 2024-02-22 LAB
ALBUMIN SERPL-MCNC: 4 G/DL (ref 3.5–5)
ALBUMIN/GLOB SERPL: 1.3 (ref 1.1–2.2)
ALP SERPL-CCNC: 76 U/L (ref 45–117)
ALT SERPL-CCNC: 24 U/L (ref 12–78)
ANION GAP SERPL CALC-SCNC: 0 MMOL/L (ref 5–15)
AST SERPL-CCNC: 18 U/L (ref 15–37)
BILIRUB SERPL-MCNC: 0.4 MG/DL (ref 0.2–1)
BUN SERPL-MCNC: 16 MG/DL (ref 6–20)
BUN/CREAT SERPL: 21 (ref 12–20)
CALCIUM SERPL-MCNC: 9.4 MG/DL (ref 8.5–10.1)
CHLORIDE SERPL-SCNC: 108 MMOL/L (ref 97–108)
CHOLEST SERPL-MCNC: 191 MG/DL
CO2 SERPL-SCNC: 30 MMOL/L (ref 21–32)
CREAT SERPL-MCNC: 0.78 MG/DL (ref 0.55–1.02)
ERYTHROCYTE [DISTWIDTH] IN BLOOD BY AUTOMATED COUNT: 12.2 % (ref 11.5–14.5)
GLOBULIN SER CALC-MCNC: 3.2 G/DL (ref 2–4)
GLUCOSE SERPL-MCNC: 99 MG/DL (ref 65–100)
HCT VFR BLD AUTO: 40 % (ref 35–47)
HDLC SERPL-MCNC: 51 MG/DL
HDLC SERPL: 3.7 (ref 0–5)
HGB BLD-MCNC: 13.2 G/DL (ref 11.5–16)
LDLC SERPL CALC-MCNC: 105.8 MG/DL (ref 0–100)
MCH RBC QN AUTO: 30.8 PG (ref 26–34)
MCHC RBC AUTO-ENTMCNC: 33 G/DL (ref 30–36.5)
MCV RBC AUTO: 93.2 FL (ref 80–99)
NRBC # BLD: 0 K/UL (ref 0–0.01)
NRBC BLD-RTO: 0 PER 100 WBC
PLATELET # BLD AUTO: 336 K/UL (ref 150–400)
PMV BLD AUTO: 10.7 FL (ref 8.9–12.9)
POTASSIUM SERPL-SCNC: 4.2 MMOL/L (ref 3.5–5.1)
PROT SERPL-MCNC: 7.2 G/DL (ref 6.4–8.2)
RBC # BLD AUTO: 4.29 M/UL (ref 3.8–5.2)
SODIUM SERPL-SCNC: 138 MMOL/L (ref 136–145)
TRIGL SERPL-MCNC: 171 MG/DL
TSH SERPL DL<=0.05 MIU/L-ACNC: 3.09 UIU/ML (ref 0.36–3.74)
VLDLC SERPL CALC-MCNC: 34.2 MG/DL
WBC # BLD AUTO: 7.9 K/UL (ref 3.6–11)

## 2024-02-22 PROCEDURE — G8427 DOCREV CUR MEDS BY ELIG CLIN: HCPCS | Performed by: FAMILY MEDICINE

## 2024-02-22 PROCEDURE — G8484 FLU IMMUNIZE NO ADMIN: HCPCS | Performed by: FAMILY MEDICINE

## 2024-02-22 PROCEDURE — 3017F COLORECTAL CA SCREEN DOC REV: CPT | Performed by: FAMILY MEDICINE

## 2024-02-22 PROCEDURE — G8400 PT W/DXA NO RESULTS DOC: HCPCS | Performed by: FAMILY MEDICINE

## 2024-02-22 PROCEDURE — 1090F PRES/ABSN URINE INCON ASSESS: CPT | Performed by: FAMILY MEDICINE

## 2024-02-22 PROCEDURE — 1123F ACP DISCUSS/DSCN MKR DOCD: CPT | Performed by: FAMILY MEDICINE

## 2024-02-22 PROCEDURE — G8420 CALC BMI NORM PARAMETERS: HCPCS | Performed by: FAMILY MEDICINE

## 2024-02-22 PROCEDURE — 99214 OFFICE O/P EST MOD 30 MIN: CPT | Performed by: FAMILY MEDICINE

## 2024-02-22 PROCEDURE — 1036F TOBACCO NON-USER: CPT | Performed by: FAMILY MEDICINE

## 2024-02-22 ASSESSMENT — PATIENT HEALTH QUESTIONNAIRE - PHQ9
SUM OF ALL RESPONSES TO PHQ QUESTIONS 1-9: 0
SUM OF ALL RESPONSES TO PHQ9 QUESTIONS 1 & 2: 0
SUM OF ALL RESPONSES TO PHQ QUESTIONS 1-9: 0
2. FEELING DOWN, DEPRESSED OR HOPELESS: 0
1. LITTLE INTEREST OR PLEASURE IN DOING THINGS: 0

## 2024-02-22 ASSESSMENT — ENCOUNTER SYMPTOMS
RESPIRATORY NEGATIVE: 1
GASTROINTESTINAL NEGATIVE: 1
ALLERGIC/IMMUNOLOGIC NEGATIVE: 1

## 2024-02-22 NOTE — PROGRESS NOTES
Chief Complaint   Patient presents with    Cholesterol Problem     Fasting    Hypothyroidism     1. \"Have you been to the ER, urgent care clinic since your last visit?  Hospitalized since your last visit?\" No    2. \"Have you seen or consulted any other health care providers outside of the [facility] since your last visit?\" Derm Dr Terhume earlier    3. For patients aged 45-75: Has the patient had a colonoscopy / FIT/ Cologuard? Yes - no Care Gap present 7/2021 Large Polyp, Repeat 3 yrs, [facility]       If the patient is female:    4. For patients aged 40-74: Has the patient had a mammogram within the past 2 years? Yes - no Care Gap present 3/2023 done @ [facility]      5. For patients aged 21-65: Has the patient had a pap smear? Yes - no Care Gap present Dr Burleson a couple of years ago      
            Diet: nothing special just tries to follow a sensible balanced diet    Caffeine: 1-2 cups of coffee qam, no tea or sodas      Etoh: very occasional    Tobacco: none    Exercise: walks daily x 40-60 minutes qam x 2-3 miles    Weight: stable in the 130's x years                 Social History     Substance and Sexual Activity   Sexual Activity Not on file       IMMUNIZATIONS     Immunization History   Administered Date(s) Administered    COVID-19, PFIZER Bivalent, DO NOT Dilute, (age 12y+), IM, 30 mcg/0.3 mL 2022    COVID-19, PFIZER GRAY top, DO NOT Dilute, (age 12 y+), IM, 30 mcg/0.3 mL 2022    COVID-19, PFIZER PURPLE top, DILUTE for use, (age 12 y+), 30mcg/0.3mL 2021, 2021, 2021, 2022    COVID-19, PFIZER, ( formula), (age 12y+), IM, 30mcg/0.3mL 10/12/2023    Influenza A (P0h0-28),all Formulations 2010    Influenza Virus Vaccine 2004, 2013, 10/06/2014, 2015, 10/22/2016, 10/17/2017, 10/16/2018, 10/05/2020, 10/19/2022    Influenza, FLUAD, (age 65 y+), Adjuvanted, 0.5mL 10/05/2020, 10/12/2021    Influenza, FLUZONE (age 65 y+), High Dose, 0.7mL 10/12/2023    Influenza, High Dose (Fluzone 65 yrs and older) 10/10/2019, 10/12/2021, 10/18/2022    Influenza, Triv, inactivated, subunit, adjuvanted, IM (Fluad 65 yrs and older) 10/20/2017, 10/11/2019    Pneumococcal, PCV-13, PREVNAR 13, (age 6w+), IM, 0.5mL 01/15/2018    Pneumococcal, PPSV23, PNEUMOVAX 23, (age 2y+), SC/IM, 0.5mL 2013, 2019    TD 5LF, TENIVAC, (age 7y+), IM, 0.5mL 2010    TDaP, ADACEL (age 10y-64y), BOOSTRIX (age 10y+), IM, 0.5mL 2016    Td vaccine (adult) 2010    Zoster Live (Zostavax) 2014    Zoster Recombinant (Shingrix) 2018, 10/10/2018, 01/15/2019         FAMILY HISTORY     Family History   Problem Relation Age of Onset    Hypertension Mother     Stroke Mother     Thyroid Disease Mother     Heart Disease Mother          of

## 2024-02-23 SDOH — HEALTH STABILITY: PHYSICAL HEALTH: ON AVERAGE, HOW MANY DAYS PER WEEK DO YOU ENGAGE IN MODERATE TO STRENUOUS EXERCISE (LIKE A BRISK WALK)?: 7 DAYS

## 2024-02-23 SDOH — HEALTH STABILITY: PHYSICAL HEALTH: ON AVERAGE, HOW MANY MINUTES DO YOU ENGAGE IN EXERCISE AT THIS LEVEL?: 50 MIN

## 2024-02-23 ASSESSMENT — PATIENT HEALTH QUESTIONNAIRE - PHQ9
SUM OF ALL RESPONSES TO PHQ QUESTIONS 1-9: 0
SUM OF ALL RESPONSES TO PHQ9 QUESTIONS 1 & 2: 0
1. LITTLE INTEREST OR PLEASURE IN DOING THINGS: 0
SUM OF ALL RESPONSES TO PHQ QUESTIONS 1-9: 0
2. FEELING DOWN, DEPRESSED OR HOPELESS: 0

## 2024-02-23 ASSESSMENT — LIFESTYLE VARIABLES
HOW OFTEN DO YOU HAVE SIX OR MORE DRINKS ON ONE OCCASION: 1
HOW MANY STANDARD DRINKS CONTAINING ALCOHOL DO YOU HAVE ON A TYPICAL DAY: 1
HOW OFTEN DO YOU HAVE A DRINK CONTAINING ALCOHOL: MONTHLY OR LESS
HOW OFTEN DO YOU HAVE A DRINK CONTAINING ALCOHOL: 2
HOW MANY STANDARD DRINKS CONTAINING ALCOHOL DO YOU HAVE ON A TYPICAL DAY: 1 OR 2

## 2024-02-25 DIAGNOSIS — E78.2 MIXED HYPERLIPIDEMIA: Primary | ICD-10-CM

## 2024-02-25 DIAGNOSIS — E03.9 ACQUIRED HYPOTHYROIDISM: ICD-10-CM

## 2024-02-25 DIAGNOSIS — N32.81 OVERACTIVE BLADDER: ICD-10-CM

## 2024-02-25 RX ORDER — LEVOTHYROXINE SODIUM 0.05 MG/1
TABLET ORAL
Qty: 90 TABLET | Refills: 3 | Status: SHIPPED | OUTPATIENT
Start: 2024-02-25

## 2024-02-25 RX ORDER — TOLTERODINE 4 MG/1
CAPSULE, EXTENDED RELEASE ORAL
Qty: 90 CAPSULE | Refills: 3 | Status: SHIPPED | OUTPATIENT
Start: 2024-02-25

## 2024-02-25 RX ORDER — ATORVASTATIN CALCIUM 20 MG/1
20 TABLET, FILM COATED ORAL DAILY
Qty: 90 TABLET | Refills: 0 | Status: SHIPPED | OUTPATIENT
Start: 2024-02-25

## 2024-03-01 ENCOUNTER — TELEMEDICINE (OUTPATIENT)
Age: 74
End: 2024-03-01
Payer: MEDICARE

## 2024-03-01 DIAGNOSIS — Z00.00 MEDICARE ANNUAL WELLNESS VISIT, SUBSEQUENT: Primary | ICD-10-CM

## 2024-03-01 PROCEDURE — 3017F COLORECTAL CA SCREEN DOC REV: CPT | Performed by: FAMILY MEDICINE

## 2024-03-01 PROCEDURE — G0439 PPPS, SUBSEQ VISIT: HCPCS | Performed by: FAMILY MEDICINE

## 2024-03-01 PROCEDURE — 1123F ACP DISCUSS/DSCN MKR DOCD: CPT | Performed by: FAMILY MEDICINE

## 2024-03-01 RX ORDER — PENTOXIFYLLINE 400 MG/1
400 TABLET, EXTENDED RELEASE ORAL 2 TIMES DAILY
COMMUNITY

## 2024-03-01 NOTE — PROGRESS NOTES
Medicare Annual Wellness Visit    Jaylin Puente is here for Medicare AWV    Assessment & Plan   Medicare annual wellness visit, subsequent  Recommendations for Preventive Services Due: see orders and patient instructions/AVS.  Recommended screening schedule for the next 5-10 years is provided to the patient in written form: see Patient Instructions/AVS.  Reviewed diet, nutrition, exercise, weight management/goals, risk reduction, cardiovascular goals for age and associated health risks, medications, effects, risks, benefits, potential interactions and possible side effects.   Age/risk based screening recommendations, health maintenance & prevention counseling.   Cancer screening USPTFS guidelines reviewed w/ pt today.   Discussed benefits/positive/negative outcomes of screening based on age/risk stratification. Informed consent for/against screening based on pt's personal hx/risk factors.   Updated in history above, health maintenance section of chart and nurse's note.   RTC 1 yr next Medicare AWV             Patient's complete Health Risk Assessment and screening values have been reviewed and are found in Flowsheets. The following problems were reviewed today and where indicated follow up appointments were made and/or referrals ordered.    Positive Risk Factor Screenings with Interventions:                    Safety:  Do you have any tripping hazards - loose or unsecured carpets or rugs?: (!) Yes  Interventions: Fall prevention, safety measures, risks discussed. Directed to AVS for additional information.   See AVS for additional education material         Patient Active Problem List   Diagnosis    Elevated liver enzymes    Family history of MI (myocardial infarction)    Osteopenia    AR (allergic rhinitis)    Overactive bladder    Mixed hyperlipidemia    ACP (advance care planning)    Granuloma annulare    Acquired hypothyroidism     Past Medical History:   Diagnosis Date    ACP (advance care planning)

## 2024-05-21 DIAGNOSIS — E78.2 MIXED HYPERLIPIDEMIA: ICD-10-CM

## 2024-05-21 LAB
CHOLEST SERPL-MCNC: 188 MG/DL
HDLC SERPL-MCNC: 44 MG/DL
HDLC SERPL: 4.3 (ref 0–5)
LDLC SERPL CALC-MCNC: 76.8 MG/DL (ref 0–100)
TRIGL SERPL-MCNC: 336 MG/DL
VLDLC SERPL CALC-MCNC: 67.2 MG/DL

## 2024-05-26 DIAGNOSIS — E78.2 MIXED HYPERLIPIDEMIA: ICD-10-CM

## 2024-05-27 RX ORDER — ATORVASTATIN CALCIUM 20 MG/1
20 TABLET, FILM COATED ORAL DAILY
Qty: 90 TABLET | Refills: 2 | Status: SHIPPED | OUTPATIENT
Start: 2024-05-27

## 2025-01-09 ENCOUNTER — APPOINTMENT (OUTPATIENT)
Facility: HOSPITAL | Age: 75
End: 2025-01-09
Payer: MEDICARE

## 2025-01-09 ENCOUNTER — HOSPITAL ENCOUNTER (EMERGENCY)
Facility: HOSPITAL | Age: 75
Discharge: HOME | End: 2025-01-09
Attending: EMERGENCY MEDICINE
Payer: MEDICARE

## 2025-01-09 VITALS
OXYGEN SATURATION: 98 % | HEART RATE: 89 BPM | WEIGHT: 141.54 LBS | BODY MASS INDEX: 25.08 KG/M2 | HEIGHT: 63 IN | DIASTOLIC BLOOD PRESSURE: 78 MMHG | SYSTOLIC BLOOD PRESSURE: 164 MMHG | TEMPERATURE: 97.4 F | RESPIRATION RATE: 16 BRPM

## 2025-01-09 DIAGNOSIS — S50.02XA CONTUSION OF LEFT ELBOW, INITIAL ENCOUNTER: Primary | ICD-10-CM

## 2025-01-09 DIAGNOSIS — S16.1XXA STRAIN OF NECK MUSCLE, INITIAL ENCOUNTER: ICD-10-CM

## 2025-01-09 DIAGNOSIS — S09.90XA CLOSED HEAD INJURY, INITIAL ENCOUNTER: ICD-10-CM
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
## 2025-01-09 PROCEDURE — 73080 X-RAY EXAM OF ELBOW: CPT

## 2025-01-09 PROCEDURE — 99283 EMERGENCY DEPT VISIT LOW MDM: CPT

## 2025-01-09 RX ORDER — MELOXICAM 15 MG/1
15 TABLET ORAL DAILY PRN
Qty: 10 TABLET | Refills: 0 | Status: SHIPPED | OUTPATIENT
Start: 2025-01-09 | End: 2025-01-24 | Stop reason: ALTCHOICE

## 2025-01-09 RX ORDER — ACETAMINOPHEN 500 MG
1000 TABLET ORAL EVERY 6 HOURS PRN
Qty: 60 TABLET | Refills: 0 | Status: SHIPPED | OUTPATIENT
Start: 2025-01-09 | End: 2025-01-24 | Stop reason: ALTCHOICE

## 2025-01-09 ASSESSMENT — PAIN DESCRIPTION - LOCATION: LOCATION: ELBOW

## 2025-01-09 ASSESSMENT — PAIN SCALES - GENERAL: PAINLEVEL_OUTOF10: 5

## 2025-01-09 ASSESSMENT — PAIN DESCRIPTION - ORIENTATION: ORIENTATION: LEFT

## 2025-01-09 ASSESSMENT — PAIN - FUNCTIONAL ASSESSMENT: PAIN_FUNCTIONAL_ASSESSMENT: 0-10

## 2025-01-09 NOTE — DISCHARGE INSTRUCTIONS
You have been evaluated in the Emergency Department today for your injuries after a slip and fall. Your evaluation did not show evidence of medical conditions requiring emergent intervention at this time. Please be aware that musculoskeletal pain commonly worsens a day or two after an injury like this before it gets better.    You may take acetaminophen (Tylenol) 2 tablets every 6 hours as well as an anti-inflammatory, either prescribed (Voltaren, Mobic) or over-the-counter (naproxen aka Aleve every 12 hours OR ibuprofen aka Motrin every 6 hours) as needed for pain. Taking both the Tylenol as well as anti-inflammatory medications in combination can be especially effective.    -Attempt to move and stretch as able. Avoid lifting/twisting over the next 48 hours.   -Take prescribed medications as well as Tylenol at the same time.   -Apply ice every 1-2 hours for 20 minutes at a time as needed for pain over the next 48 hours, then every 6-8 hours for 20 minutes at a time as needed for pain.     Return to the ER immediately for worsening or uncontrolled pain, difficulty walking, numbness or weakness in your arms or legs, chest pain, shortness of breath, confusion, vomiting, or for any other concerning symptoms.    Thank you for choosing us for your care.

## 2025-01-09 NOTE — ED TRIAGE NOTES
Pt arrives with slip and fall on ice, resulting in hitting back of head and L arm. Denies blood thinner use. Denies LOC. Denies n/v and denies blurred vision.

## 2025-01-09 NOTE — ED PROVIDER NOTES
[facility]  EMERGENCY DEPARTMENT ENCOUNTER      Pt Name: Jaylin Puente  MRN: [id number]  Birthdate 1950  Date of evaluation: 1/9/2025  Provider: Renan Barber MD    CHIEF COMPLAINT       Chief Complaint   Patient presents with    Fall         HISTORY OF PRESENT ILLNESS   (Location/Symptom, Timing/Onset, Context/Setting, Quality, Duration, Modifying Factors, Severity)  Note limiting factors.   HPI      Review of External Medical Records:     Nursing Notes were reviewed.    REVIEW OF SYSTEMS    (2-9 systems for level 4, 10 or more for level 5)     Review of Systems    Except as noted above the remainder of the review of systems was reviewed and negative.       PAST MEDICAL HISTORY     Past Medical History:   Diagnosis Date    ACP (advance care planning) 01/16/2019    Discussion 1-2019. Patient has an AMD. Asked to provide copy for file.     Acquired hypothyroidism 05/19/2010 2000    AR (allergic rhinitis) 07/31/2002    Elevated liver enzymes 05/13/2011    Family history of MI (myocardial infarction) 05/19/2010    Granuloma annulare 04/13/2022    [facility] Dermatology Dr. Kline    Mixed hyperlipidemia 05/13/2011    Osteopenia 05/19/2010    Overactive bladder 05/13/2011         SURGICAL HISTORY       Past Surgical History:   Procedure Laterality Date    BUNIONECTOMY Right 01/05/2023    BUNIONECTOMY Left 04/28/2022    COLONOSCOPY  07/2021    Large Polyp, Repeat 3 yrs, [facility]    COLONOSCOPY  2011    Ok x 10 yrs, Dr Dunn    COLONOSCOPY  02/2003    Normal, f/u 5 yr; Dr Gigi Li    EKG ORDERABLE  01/18/2010    Normal, NSR, rate 76    HM DEXA SCAN  12/2009    Osteopenia, per Gyn    PELVIC LAPAROSCOPY  1970, 1980    for infertility eval    SALPINGO-OOPHORECTOMY  1970's    for ectopic pregnancy    US ABDOMEN COMPLETE  9/2007, [facility]    normal         CURRENT MEDICATIONS       Previous Medications    ATORVASTATIN (LIPITOR) 20 MG TABLET    TAKE 1 TABLET  risk for intracranial hemorrhage despite normal examination findings. The patient is currently asymptomatic and neurologically intact.  - Patient opted to forgo CT scan after discussion of risks and benefits  - Advised to monitor for worsening pain, nausea, or mental status changes  - Instructed to avoid aspirin for 24 hours  - Recommended close observation at home with spouse    Left elbow contusion  Patient reports hitting her left arm during the fall. Examination of the left elbow shows mild effusion and ecchymosis. There is no focal tenderness, intact range of motion, and no deformity.  - Ice application to affected area  - X-ray of left elbow to r/o fx    Left trapezius strain  Patient reports soreness in the left upper arm area. Examination reveals fullness in the left trapezius muscle but intact range of motion without meningismus. No midline tenderness, deformity, or step-off noted. She is NVI.  - Ice application to affected area  - Low suspicion for C-spine injury    Amount and/or Complexity of Data Reviewed  Independent Historian: spouse  Radiology: ordered.           Discussed findings, likely diagnosis, likely course, symptomatic management w/ OTC +/- Rx meds, need for follow-up, and return precautions with patient and       CONSULTS:  None    PROCEDURES:  Unless otherwise noted below, none     Procedures    MEDS:  Medications - No data to display        FINAL IMPRESSION      1. Contusion of left elbow, initial encounter    2. Contusion of scalp, initial encounter    3. Fall due to slipping on ice or snow, initial encounter          DISPOSITION/PLAN   DISPOSITION   Decision To Discharge  01/09/2025 01:06:20 PM      PATIENT REFERRED TO:  Rachel Nieves MD  [address] [place] [id number] [phone]            DISCHARGE MEDICATIONS:  Discharge Medication List as of 1/9/2025  1:07 PM        START taking these medications    Details   meloxicam (MOBIC) 15 MG tablet Take 1 tablet

## 2025-01-21 SDOH — ECONOMIC STABILITY: TRANSPORTATION INSECURITY
IN THE PAST 12 MONTHS, HAS THE LACK OF TRANSPORTATION KEPT YOU FROM MEDICAL APPOINTMENTS OR FROM GETTING MEDICATIONS?: NO

## 2025-01-21 SDOH — ECONOMIC STABILITY: TRANSPORTATION INSECURITY
IN THE PAST 12 MONTHS, HAS LACK OF TRANSPORTATION KEPT YOU FROM MEETINGS, WORK, OR FROM GETTING THINGS NEEDED FOR DAILY LIVING?: NO

## 2025-01-21 SDOH — ECONOMIC STABILITY: FOOD INSECURITY: WITHIN THE PAST 12 MONTHS, YOU WORRIED THAT YOUR FOOD WOULD RUN OUT BEFORE YOU GOT MONEY TO BUY MORE.: NEVER TRUE

## 2025-01-21 SDOH — ECONOMIC STABILITY: FOOD INSECURITY: WITHIN THE PAST 12 MONTHS, THE FOOD YOU BOUGHT JUST DIDN'T LAST AND YOU DIDN'T HAVE MONEY TO GET MORE.: NEVER TRUE

## 2025-01-21 SDOH — ECONOMIC STABILITY: INCOME INSECURITY: IN THE LAST 12 MONTHS, WAS THERE A TIME WHEN YOU WERE NOT ABLE TO PAY THE MORTGAGE OR RENT ON TIME?: NO

## 2025-01-24 ENCOUNTER — OFFICE VISIT (OUTPATIENT)
Age: 75
End: 2025-01-24
Payer: MEDICARE

## 2025-01-24 VITALS
HEIGHT: 63 IN | RESPIRATION RATE: 16 BRPM | BODY MASS INDEX: 24.84 KG/M2 | WEIGHT: 140.2 LBS | OXYGEN SATURATION: 98 % | SYSTOLIC BLOOD PRESSURE: 136 MMHG | HEART RATE: 82 BPM | DIASTOLIC BLOOD PRESSURE: 78 MMHG | TEMPERATURE: 96.9 F

## 2025-01-24 DIAGNOSIS — E03.9 ACQUIRED HYPOTHYROIDISM: ICD-10-CM

## 2025-01-24 DIAGNOSIS — N32.81 OVERACTIVE BLADDER: ICD-10-CM

## 2025-01-24 DIAGNOSIS — E78.2 MIXED HYPERLIPIDEMIA: Primary | ICD-10-CM

## 2025-01-24 LAB
ALBUMIN SERPL-MCNC: 4.1 G/DL (ref 3.5–5)
ALBUMIN/GLOB SERPL: 1.4 (ref 1.1–2.2)
ALP SERPL-CCNC: 78 U/L (ref 45–117)
ALT SERPL-CCNC: 26 U/L (ref 12–78)
ANION GAP SERPL CALC-SCNC: 6 MMOL/L (ref 2–12)
AST SERPL-CCNC: 23 U/L (ref 15–37)
BILIRUB SERPL-MCNC: 0.4 MG/DL (ref 0.2–1)
BUN SERPL-MCNC: 17 MG/DL (ref 6–20)
BUN/CREAT SERPL: 22 (ref 12–20)
CALCIUM SERPL-MCNC: 9.6 MG/DL (ref 8.5–10.1)
CHLORIDE SERPL-SCNC: 104 MMOL/L (ref 97–108)
CHOLEST SERPL-MCNC: 169 MG/DL
CO2 SERPL-SCNC: 28 MMOL/L (ref 21–32)
CREAT SERPL-MCNC: 0.79 MG/DL (ref 0.55–1.02)
ERYTHROCYTE [DISTWIDTH] IN BLOOD BY AUTOMATED COUNT: 12.3 % (ref 11.5–14.5)
GLOBULIN SER CALC-MCNC: 2.9 G/DL (ref 2–4)
GLUCOSE SERPL-MCNC: 100 MG/DL (ref 65–100)
HCT VFR BLD AUTO: 41 % (ref 35–47)
HDLC SERPL-MCNC: 56 MG/DL
HDLC SERPL: 3 (ref 0–5)
HGB BLD-MCNC: 13.1 G/DL (ref 11.5–16)
LDLC SERPL CALC-MCNC: 85.4 MG/DL (ref 0–100)
MCH RBC QN AUTO: 30.2 PG (ref 26–34)
MCHC RBC AUTO-ENTMCNC: 32 G/DL (ref 30–36.5)
MCV RBC AUTO: 94.5 FL (ref 80–99)
NRBC # BLD: 0 K/UL (ref 0–0.01)
NRBC BLD-RTO: 0 PER 100 WBC
PLATELET # BLD AUTO: 347 K/UL (ref 150–400)
PMV BLD AUTO: 11 FL (ref 8.9–12.9)
POTASSIUM SERPL-SCNC: 4.2 MMOL/L (ref 3.5–5.1)
PROT SERPL-MCNC: 7 G/DL (ref 6.4–8.2)
RBC # BLD AUTO: 4.34 M/UL (ref 3.8–5.2)
SODIUM SERPL-SCNC: 138 MMOL/L (ref 136–145)
TRIGL SERPL-MCNC: 138 MG/DL
TSH SERPL DL<=0.05 MIU/L-ACNC: 2.85 UIU/ML (ref 0.36–3.74)
VLDLC SERPL CALC-MCNC: 27.6 MG/DL
WBC # BLD AUTO: 7.8 K/UL (ref 3.6–11)

## 2025-01-24 PROCEDURE — 99214 OFFICE O/P EST MOD 30 MIN: CPT | Performed by: FAMILY MEDICINE

## 2025-01-24 ASSESSMENT — ENCOUNTER SYMPTOMS
ALLERGIC/IMMUNOLOGIC NEGATIVE: 1
GASTROINTESTINAL NEGATIVE: 1
RESPIRATORY NEGATIVE: 1

## 2025-01-24 ASSESSMENT — PATIENT HEALTH QUESTIONNAIRE - PHQ9
SUM OF ALL RESPONSES TO PHQ QUESTIONS 1-9: 0
SUM OF ALL RESPONSES TO PHQ9 QUESTIONS 1 & 2: 0
SUM OF ALL RESPONSES TO PHQ QUESTIONS 1-9: 0
2. FEELING DOWN, DEPRESSED OR HOPELESS: NOT AT ALL
SUM OF ALL RESPONSES TO PHQ QUESTIONS 1-9: 0
1. LITTLE INTEREST OR PLEASURE IN DOING THINGS: NOT AT ALL
SUM OF ALL RESPONSES TO PHQ QUESTIONS 1-9: 0

## 2025-01-24 NOTE — PROGRESS NOTES
Chief Complaint   Patient presents with    Cholesterol Problem     Fasting    Hypothyroidism    Medication Check     HISTORY OF PRESENT ILLNESS   HPI  Fasting follow up Hypercholesterolemia, Hypothyroidism, labs and medication check.   Complying routinely w/ medication regimen updated below and tolerating w/o reaction or side effects  Overall getting along well and feeling well in general.  No complaints or concerns at this time.   Diet: nothing special just tries to follow a sensible balanced diet  Caffeine: 1 large mug of coffee qam, occ decaff tea, no sodas    Etoh: very occasional  Tobacco: none  Exercise: walks daily x 40-60 minutes qam x 2-3 miles  Weight: stable in the 130's-140 lbs range over the years       REVIEW OF SYMPTOMS   Review of Systems   Constitutional: Negative.    HENT: Negative.     Respiratory: Negative.     Cardiovascular: Negative.    Gastrointestinal: Negative.    Endocrine: Negative.    Genitourinary: Negative.    Musculoskeletal: Negative.  Negative for myalgias.   Skin:         Followed by Dermatology for granuloma annulare. Patient self dc'd her Trental this past year bc she didn't find it was helping. She plans to d/w her dermatologist at next appt.    Allergic/Immunologic: Negative.    Neurological: Negative.    Hematological: Negative.              PROBLEM LIST/MEDICAL HISTORY     Patient Active Problem List    Diagnosis Date Noted    Granuloma annulare 04/13/2022     Overview Note:     Kenn Kline, then Dr. Escobar        ACP (advance care planning) 01/16/2019     Overview Note:     Discussion 1-2019. Patient has an AMD. Asked to provide copy for file.         Elevated liver enzymes 05/13/2011     Overview Note:     2007, Hepatitis panel, GI w/u  and U/s negative        Overactive bladder 05/13/2011    Mixed hyperlipidemia 05/13/2011    Family history of MI (myocardial infarction) 05/19/2010     Overview Note:     Father MI at age 48 yo        Osteopenia

## 2025-01-24 NOTE — PROGRESS NOTES
Chief Complaint   Patient presents with    Medication Refill     \"Have you been to the ER, urgent care clinic since your last visit?  Hospitalized since your last visit?\"    YES - When: approximately 2  weeks ago.  Where and Why: [facility] Er DX: Fall.    “Have you seen or consulted any other health care providers outside of [facility] since your last visit?”    NO                   1/24/2025     8:03 AM   PHQ-9    Little interest or pleasure in doing things 0   Feeling down, depressed, or hopeless 0   PHQ-2 Score 0   PHQ-9 Total Score 0           Financial Resource Strain: Low Risk  (8/23/2023)    Overall Financial Resource Strain (CARDIA)     Difficulty of Paying Living Expenses: Not hard at all      Food Insecurity: No Food Insecurity (1/21/2025)    Hunger Vital Sign     Worried About Running Out of Food in the Last Year: Never true     Ran Out of Food in the Last Year: Never true          Health Maintenance Due   Topic Date Due    DEXA (modify frequency per FRAX score)  Never done    Flu vaccine (1) 08/01/2024    COVID-19 Vaccine (8 - 2023-24 season) 09/01/2024    Depression Screen  02/23/2025

## 2025-01-26 DIAGNOSIS — E03.9 ACQUIRED HYPOTHYROIDISM: ICD-10-CM

## 2025-01-26 DIAGNOSIS — E78.2 MIXED HYPERLIPIDEMIA: ICD-10-CM

## 2025-01-26 DIAGNOSIS — N32.81 OVERACTIVE BLADDER: ICD-10-CM

## 2025-01-26 RX ORDER — TOLTERODINE 4 MG/1
CAPSULE, EXTENDED RELEASE ORAL
Qty: 90 CAPSULE | Refills: 3 | Status: SHIPPED | OUTPATIENT
Start: 2025-01-26

## 2025-01-26 RX ORDER — ATORVASTATIN CALCIUM 20 MG/1
20 TABLET, FILM COATED ORAL DAILY
Qty: 90 TABLET | Refills: 3 | Status: SHIPPED | OUTPATIENT
Start: 2025-01-26

## 2025-01-26 RX ORDER — LEVOTHYROXINE SODIUM 50 UG/1
TABLET ORAL
Qty: 90 TABLET | Refills: 3 | Status: SHIPPED | OUTPATIENT
Start: 2025-01-26